# Patient Record
Sex: MALE | Race: WHITE | NOT HISPANIC OR LATINO | Employment: UNEMPLOYED | URBAN - METROPOLITAN AREA
[De-identification: names, ages, dates, MRNs, and addresses within clinical notes are randomized per-mention and may not be internally consistent; named-entity substitution may affect disease eponyms.]

---

## 2017-07-13 ENCOUNTER — OFFICE VISIT (OUTPATIENT)
Dept: FAMILY MEDICINE CLINIC | Facility: CLINIC | Age: 47
End: 2017-07-13

## 2017-07-13 VITALS
BODY MASS INDEX: 28.62 KG/M2 | DIASTOLIC BLOOD PRESSURE: 78 MMHG | WEIGHT: 211 LBS | HEART RATE: 80 BPM | SYSTOLIC BLOOD PRESSURE: 126 MMHG | OXYGEN SATURATION: 97 %

## 2017-07-13 DIAGNOSIS — J30.89 SEASONAL ALLERGIC RHINITIS DUE TO OTHER ALLERGIC TRIGGER: ICD-10-CM

## 2017-07-13 DIAGNOSIS — K21.9 GASTROESOPHAGEAL REFLUX DISEASE WITHOUT ESOPHAGITIS: Primary | ICD-10-CM

## 2017-07-13 PROCEDURE — 99203 OFFICE O/P NEW LOW 30 MIN: CPT | Performed by: NURSE PRACTITIONER

## 2017-07-13 RX ORDER — LORATADINE 10 MG/1
10 TABLET ORAL
COMMUNITY
End: 2019-02-04

## 2017-07-13 RX ORDER — ALBUTEROL SULFATE 90 UG/1
2 AEROSOL, METERED RESPIRATORY (INHALATION) EVERY 4 HOURS PRN
Qty: 1 INHALER | Refills: 1 | Status: SHIPPED | OUTPATIENT
Start: 2017-07-13 | End: 2019-02-04

## 2017-07-13 RX ORDER — MULTIVIT WITH MINERALS/LUTEIN
250 TABLET ORAL
COMMUNITY
End: 2019-02-04

## 2017-07-13 RX ORDER — MULTIVIT-MIN/IRON FUM/FOLIC AC 7.5 MG-4
1 TABLET ORAL
COMMUNITY
End: 2019-02-04

## 2017-07-13 RX ORDER — ALBUTEROL SULFATE 90 UG/1
2 AEROSOL, METERED RESPIRATORY (INHALATION)
COMMUNITY
Start: 2017-03-30 | End: 2017-07-13 | Stop reason: SDUPTHER

## 2017-07-13 NOTE — PROGRESS NOTES
Fred Bhardwaj is a 47 y.o. male.  Seen 07/13/2017    Assessment/Plan   Problem List Items Addressed This Visit     None      Visit Diagnoses     Gastroesophageal reflux disease without esophagitis    -  Primary    Seasonal allergic rhinitis due to other allergic trigger                 No Follow-up on file.  There are no Patient Instructions on file for this visit.    Vitals:    07/13/17 0934   BP: 126/78   Pulse: 80   SpO2: 97%   Weight: 211 lb (95.7 kg)     Body mass index is 28.62 kg/(m^2).    Subjective   New to out office was seeing a [place] physician but this is closer to his home.  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  H/O of Gerd and was seen for chest pain at the [facility] dx.d with Gerd and takes ranitidine as needed and watched his diet.  Takes an albuterol inhaler prn for the last couple of years and uses claritin prn.  Chief Complaint   Patient presents with   • Bronchitis     Pt is here to establish care states he usually gets bronchitis yearly. Hospital in the Spring dx with acid reflux. No complaints chest pain.      Social History   Substance Use Topics   • Smoking status: Never Smoker   • Smokeless tobacco: Never Used   • Alcohol use Yes       History of Present Illness     The following portions of the patient's history were reviewed and updated as appropriate:PMHroutine: Social history , Past Medical History, Allergies and Current Medications    Review of Systems   All other systems reviewed and are negative.      Objective   Physical Exam   Constitutional: He is oriented to person, place, and time. He appears well-developed and well-nourished. No distress.   HENT:   Head: Normocephalic and atraumatic.   Eyes: EOM are normal.   Cardiovascular: Normal rate and regular rhythm.    Pulmonary/Chest: Effort normal and breath sounds normal.   Musculoskeletal: Normal range of motion.   Neurological: He is alert and  oriented to person, place, and time.   Skin: Skin is warm and dry.   Nursing note and vitals reviewed.    Reviewed Data:  No visits with results within 1 Month(s) from this visit.  Latest known visit with results is:    Admission on 08/22/2016, Discharged on 08/22/2016   Component Date Value Ref Range Status   • Glucose 08/22/2016 137* 65 - 99 mg/dL Final   • BUN 08/22/2016 16  6 - 20 mg/dL Final   • Creatinine 08/22/2016 0.97  0.76 - 1.27 mg/dL Final   • Sodium 08/22/2016 138  136 - 145 mmol/L Final   • Potassium 08/22/2016 3.7  3.5 - 5.2 mmol/L Final   • Chloride 08/22/2016 99  98 - 107 mmol/L Final   • CO2 08/22/2016 24.5  22.0 - 29.0 mmol/L Final   • Calcium 08/22/2016 9.8  8.6 - 10.5 mg/dL Final   • Total Protein 08/22/2016 7.6  6.0 - 8.5 g/dL Final   • Albumin 08/22/2016 5.10  3.50 - 5.20 g/dL Final   • ALT (SGPT) 08/22/2016 31  1 - 41 U/L Final   • AST (SGOT) 08/22/2016 18  1 - 40 U/L Final   • Alkaline Phosphatase 08/22/2016 67  39 - 117 U/L Final   • Total Bilirubin 08/22/2016 0.2  0.1 - 1.2 mg/dL Final   • eGFR Non African Amer 08/22/2016 83  >60 mL/min/1.73 Final   • Globulin 08/22/2016 2.5  gm/dL Final   • A/G Ratio 08/22/2016 2.0  g/dL Final   • BUN/Creatinine Ratio 08/22/2016 16.5  7.0 - 25.0 Final   • Anion Gap 08/22/2016 14.5  mmol/L Final   • Troponin T 08/22/2016 <0.010  0.000 - 0.030 ng/mL Final   • Extra Tube 08/22/2016 hold for add-on   Final    Auto resulted   • Extra Tube 08/22/2016 Hold for add-ons.   Final    Auto resulted.   • Extra Tube 08/22/2016 hold for add-on   Final    Auto resulted   • Extra Tube 08/22/2016 Hold for add-ons.   Final    Auto resulted.   • WBC 08/22/2016 13.26* 4.50 - 10.70 10*3/mm3 Final   • RBC 08/22/2016 5.47  4.60 - 6.00 10*6/mm3 Final   • Hemoglobin 08/22/2016 15.9  13.7 - 17.6 g/dL Final   • Hematocrit 08/22/2016 46.5  40.4 - 52.2 % Final   • MCV 08/22/2016 85.0  79.8 - 96.2 fL Final   • MCH 08/22/2016 29.1  27.0 - 32.7 pg Final   • MCHC 08/22/2016 34.2  32.6 -  36.4 g/dL Final   • RDW 08/22/2016 13.7  11.5 - 14.5 % Final   • RDW-SD 08/22/2016 42.1  37.0 - 54.0 fl Final   • MPV 08/22/2016 10.0  6.0 - 12.0 fL Final   • Platelets 08/22/2016 243  140 - 500 10*3/mm3 Final   • Neutrophil % 08/22/2016 80.0* 42.7 - 76.0 % Final   • Lymphocyte % 08/22/2016 14.0* 19.6 - 45.3 % Final   • Monocyte % 08/22/2016 4.0* 5.0 - 12.0 % Final   • Eosinophil % 08/22/2016 0.2* 0.3 - 6.2 % Final   • Basophil % 08/22/2016 0.2  0.0 - 1.5 % Final   • Immature Grans % 08/22/2016 1.6* 0.0 - 0.5 % Final   • Neutrophils, Absolute 08/22/2016 10.62* 1.90 - 8.10 10*3/mm3 Final   • Lymphocytes, Absolute 08/22/2016 1.85  0.90 - 4.80 10*3/mm3 Final   • Monocytes, Absolute 08/22/2016 0.53  0.20 - 1.20 10*3/mm3 Final   • Eosinophils, Absolute 08/22/2016 0.02  0.00 - 0.70 10*3/mm3 Final   • Basophils, Absolute 08/22/2016 0.03  0.00 - 0.20 10*3/mm3 Final   • Immature Grans, Absolute 08/22/2016 0.21* 0.00 - 0.03 10*3/mm3 Final     Discussed medication usage and need to return in a 7-8 months for his yearly physical exam.

## 2017-09-06 ENCOUNTER — OFFICE VISIT (OUTPATIENT)
Dept: FAMILY MEDICINE CLINIC | Facility: CLINIC | Age: 47
End: 2017-09-06

## 2017-09-06 VITALS
HEART RATE: 97 BPM | WEIGHT: 212 LBS | OXYGEN SATURATION: 96 % | HEIGHT: 72 IN | DIASTOLIC BLOOD PRESSURE: 78 MMHG | BODY MASS INDEX: 28.71 KG/M2 | SYSTOLIC BLOOD PRESSURE: 138 MMHG

## 2017-09-06 DIAGNOSIS — Z23 NEED FOR VACCINATION: ICD-10-CM

## 2017-09-06 DIAGNOSIS — J01.00 ACUTE NON-RECURRENT MAXILLARY SINUSITIS: ICD-10-CM

## 2017-09-06 DIAGNOSIS — S61.219A LACERATION OF FINGER OF RIGHT HAND, INITIAL ENCOUNTER: Primary | ICD-10-CM

## 2017-09-06 DIAGNOSIS — H93.13 TINNITUS, BILATERAL: ICD-10-CM

## 2017-09-06 PROCEDURE — 99214 OFFICE O/P EST MOD 30 MIN: CPT | Performed by: NURSE PRACTITIONER

## 2017-09-06 PROCEDURE — 90471 IMMUNIZATION ADMIN: CPT | Performed by: NURSE PRACTITIONER

## 2017-09-06 PROCEDURE — 90472 IMMUNIZATION ADMIN EACH ADD: CPT | Performed by: NURSE PRACTITIONER

## 2017-09-06 PROCEDURE — 90686 IIV4 VACC NO PRSV 0.5 ML IM: CPT | Performed by: NURSE PRACTITIONER

## 2017-09-06 PROCEDURE — 90715 TDAP VACCINE 7 YRS/> IM: CPT | Performed by: NURSE PRACTITIONER

## 2017-09-06 NOTE — PROGRESS NOTES
"Fred Bhardwaj is a 47 y.o. male.Patient states that 3 days ago he cut his ring finger on the right hand on aluminum broom.    He also states that two weeks ago he was seen at an immediate care for sinus pressure and dizziness. He was given Amox, which he finishes today and Zofran. He continues to have ringing in his ears and dizziness and nasal drainage.   He took some antihistamine with a decongestant in it for a couple of days and the tinnitus cleared up.    Seen 09/06/2017    Assessment/Plan   Problem List Items Addressed This Visit     None      Visit Diagnoses     Laceration of finger of right hand, initial encounter    -  Primary    Need for vaccination        Relevant Orders    Flu Vaccine Quad PF 3YR+ (FLUARIX/FLUZONE 1636-9000) (Completed)    Acute non-recurrent maxillary sinusitis        Tinnitus, bilateral                 No Follow-up on file.  There are no Patient Instructions on file for this visit.    Subjective     Chief Complaint   Patient presents with   • Dizziness   • Laceration     Social History   Substance Use Topics   • Smoking status: Never Smoker   • Smokeless tobacco: Never Used   • Alcohol use Yes       History of Present Illness     The following portions of the patient's history were reviewed and updated as appropriate:PMHroutine: Social history , Allergies, Current Medications, Active Problem List and Health Maintenance    Review of Systems   Constitutional: Negative for activity change and appetite change.   HENT: Positive for sinus pressure, sore throat and tinnitus. Negative for ear pain.    Gastrointestinal: Negative for nausea.   Skin: Positive for wound.   Neurological: Positive for dizziness. Negative for headaches.       Objective   Vitals:    09/06/17 1136   BP: 138/78   Pulse: 97   SpO2: 96%   Weight: 212 lb (96.2 kg)   Height: 72\" (182.9 cm)     Body mass index is 28.75 kg/(m^2).  Physical Exam   Constitutional: He appears well-developed and well-nourished. No distress. "   HENT:   Head: Normocephalic and atraumatic.   Right Ear: External ear normal.   Mouth/Throat: Oropharynx is clear and moist.   Lt tm with fluid   Eyes: EOM are normal. Pupils are equal, round, and reactive to light.   Neck: Neck supple.   Cardiovascular: Normal rate and regular rhythm.    Pulmonary/Chest: Effort normal and breath sounds normal.   Neurological: He is alert.   Flap laceration to right ring finger ventral aspect. Healing well no drainage of erythema.   Skin: Skin is warm.   Nursing note and vitals reviewed.    Reviewed Data:  No visits with results within 1 Month(s) from this visit.  Latest known visit with results is:    Admission on 08/22/2016, Discharged on 08/22/2016   Component Date Value Ref Range Status   • Glucose 08/22/2016 137* 65 - 99 mg/dL Final   • BUN 08/22/2016 16  6 - 20 mg/dL Final   • Creatinine 08/22/2016 0.97  0.76 - 1.27 mg/dL Final   • Sodium 08/22/2016 138  136 - 145 mmol/L Final   • Potassium 08/22/2016 3.7  3.5 - 5.2 mmol/L Final   • Chloride 08/22/2016 99  98 - 107 mmol/L Final   • CO2 08/22/2016 24.5  22.0 - 29.0 mmol/L Final   • Calcium 08/22/2016 9.8  8.6 - 10.5 mg/dL Final   • Total Protein 08/22/2016 7.6  6.0 - 8.5 g/dL Final   • Albumin 08/22/2016 5.10  3.50 - 5.20 g/dL Final   • ALT (SGPT) 08/22/2016 31  1 - 41 U/L Final   • AST (SGOT) 08/22/2016 18  1 - 40 U/L Final   • Alkaline Phosphatase 08/22/2016 67  39 - 117 U/L Final   • Total Bilirubin 08/22/2016 0.2  0.1 - 1.2 mg/dL Final   • eGFR Non African Amer 08/22/2016 83  >60 mL/min/1.73 Final   • Globulin 08/22/2016 2.5  gm/dL Final   • A/G Ratio 08/22/2016 2.0  g/dL Final   • BUN/Creatinine Ratio 08/22/2016 16.5  7.0 - 25.0 Final   • Anion Gap 08/22/2016 14.5  mmol/L Final   • Troponin T 08/22/2016 <0.010  0.000 - 0.030 ng/mL Final   • Extra Tube 08/22/2016 hold for add-on   Final    Auto resulted   • Extra Tube 08/22/2016 Hold for add-ons.   Final    Auto resulted.   • Extra Tube 08/22/2016 hold for add-on   Final     Auto resulted   • Extra Tube 08/22/2016 Hold for add-ons.   Final    Auto resulted.   • WBC 08/22/2016 13.26* 4.50 - 10.70 10*3/mm3 Final   • RBC 08/22/2016 5.47  4.60 - 6.00 10*6/mm3 Final   • Hemoglobin 08/22/2016 15.9  13.7 - 17.6 g/dL Final   • Hematocrit 08/22/2016 46.5  40.4 - 52.2 % Final   • MCV 08/22/2016 85.0  79.8 - 96.2 fL Final   • MCH 08/22/2016 29.1  27.0 - 32.7 pg Final   • MCHC 08/22/2016 34.2  32.6 - 36.4 g/dL Final   • RDW 08/22/2016 13.7  11.5 - 14.5 % Final   • RDW-SD 08/22/2016 42.1  37.0 - 54.0 fl Final   • MPV 08/22/2016 10.0  6.0 - 12.0 fL Final   • Platelets 08/22/2016 243  140 - 500 10*3/mm3 Final   • Neutrophil % 08/22/2016 80.0* 42.7 - 76.0 % Final   • Lymphocyte % 08/22/2016 14.0* 19.6 - 45.3 % Final   • Monocyte % 08/22/2016 4.0* 5.0 - 12.0 % Final   • Eosinophil % 08/22/2016 0.2* 0.3 - 6.2 % Final   • Basophil % 08/22/2016 0.2  0.0 - 1.5 % Final   • Immature Grans % 08/22/2016 1.6* 0.0 - 0.5 % Final   • Neutrophils, Absolute 08/22/2016 10.62* 1.90 - 8.10 10*3/mm3 Final   • Lymphocytes, Absolute 08/22/2016 1.85  0.90 - 4.80 10*3/mm3 Final   • Monocytes, Absolute 08/22/2016 0.53  0.20 - 1.20 10*3/mm3 Final   • Eosinophils, Absolute 08/22/2016 0.02  0.00 - 0.70 10*3/mm3 Final   • Basophils, Absolute 08/22/2016 0.03  0.00 - 0.20 10*3/mm3 Final   • Immature Grans, Absolute 08/22/2016 0.21* 0.00 - 0.03 10*3/mm3 Final     Discussed wearing a splint at night on his finger to assist in the healing process and pt is good with the plan.

## 2017-09-20 ENCOUNTER — OFFICE VISIT (OUTPATIENT)
Dept: FAMILY MEDICINE CLINIC | Facility: CLINIC | Age: 47
End: 2017-09-20

## 2017-09-20 VITALS
OXYGEN SATURATION: 98 % | DIASTOLIC BLOOD PRESSURE: 80 MMHG | HEART RATE: 79 BPM | BODY MASS INDEX: 28.17 KG/M2 | SYSTOLIC BLOOD PRESSURE: 118 MMHG | HEIGHT: 72 IN | WEIGHT: 208 LBS

## 2017-09-20 DIAGNOSIS — H66.004 RECURRENT ACUTE SUPPURATIVE OTITIS MEDIA OF RIGHT EAR WITHOUT SPONTANEOUS RUPTURE OF TYMPANIC MEMBRANE: ICD-10-CM

## 2017-09-20 DIAGNOSIS — J30.1 SEASONAL ALLERGIC RHINITIS DUE TO POLLEN: Primary | ICD-10-CM

## 2017-09-20 PROCEDURE — 99213 OFFICE O/P EST LOW 20 MIN: CPT | Performed by: NURSE PRACTITIONER

## 2017-09-20 RX ORDER — AMOXICILLIN 875 MG/1
875 TABLET, COATED ORAL 2 TIMES DAILY
Qty: 20 TABLET | Refills: 0 | Status: SHIPPED | OUTPATIENT
Start: 2017-09-20 | End: 2017-10-03

## 2017-09-20 RX ORDER — FLUTICASONE PROPIONATE 50 MCG
2 SPRAY, SUSPENSION (ML) NASAL DAILY
Qty: 18.2 BOTTLE | Refills: 2 | Status: SHIPPED | OUTPATIENT
Start: 2017-09-20 | End: 2017-10-03

## 2017-09-20 NOTE — PROGRESS NOTES
"Fred Bhardwaj is a 47 y.o. male. Sinus Pain: Patient complains of bilateral ear pressure/pain. Symptoms include congestion, nasal congestion, non productive cough, post nasal drip and sinus pressure with no fever, chills, night sweats or weight loss. Onset of symptoms was 5 days ago, gradually worsening since that time. He is drinking plenty of fluids.  Past history is significant for no history of pneumonia or bronchitis. Patient is non-smoker. Pt complains of left ear pain and headache.   Pt has a hx of ear pain.     Seen 09/20/2017    Assessment/Plan   Problem List Items Addressed This Visit     None      Visit Diagnoses     Seasonal allergic rhinitis due to pollen    -  Primary    Relevant Medications    fluticasone (FLONASE) 50 MCG/ACT nasal spray    amoxicillin (AMOXIL) 875 MG tablet    Recurrent acute suppurative otitis media of right ear without spontaneous rupture of tympanic membrane                 No Follow-up on file.  Patient Instructions   Pt has otitis media and can be excused from work tonight       Subjective     Chief Complaint   Patient presents with   • Dizziness   • Ear pain     Social History   Substance Use Topics   • Smoking status: Never Smoker   • Smokeless tobacco: Never Used   • Alcohol use Yes       History of Present Illness     The following portions of the patient's history were reviewed and updated as appropriate:PMHroutine: Social history , Allergies, Current Medications, Active Problem List and Health Maintenance    Review of Systems   HENT: Positive for sinus pain and tinnitus.    Neurological: Positive for dizziness.       Objective   Vitals:    09/20/17 1053   BP: 118/80   Pulse: 79   SpO2: 98%   Weight: 208 lb (94.3 kg)   Height: 72\" (182.9 cm)     Body mass index is 28.21 kg/(m^2).  Physical Exam   Constitutional: He is oriented to person, place, and time. He appears well-developed and well-nourished. No distress.   HENT:   Head: Normocephalic and atraumatic.   Right Ear: " Hearing and external ear normal.   Left Ear: Hearing and external ear normal. Tympanic membrane is injected. A middle ear effusion is present.   Nose: Nose normal.   Mouth/Throat: Oropharynx is clear and moist. No oropharyngeal exudate.   Eyes: Conjunctivae and EOM are normal. Pupils are equal, round, and reactive to light.   Neck: Normal range of motion.   Cardiovascular: Normal rate and regular rhythm.    Pulmonary/Chest: Effort normal and breath sounds normal. No stridor. No respiratory distress. He has no wheezes.   Lymphadenopathy:     He has no cervical adenopathy.   Neurological: He is alert and oriented to person, place, and time.   Skin: Skin is warm and dry.   Psychiatric: He has a normal mood and affect. His behavior is normal.   Nursing note and vitals reviewed.    Reviewed Data:  No visits with results within 1 Month(s) from this visit.  Latest known visit with results is:    Admission on 08/22/2016, Discharged on 08/22/2016   Component Date Value Ref Range Status   • Glucose 08/22/2016 137* 65 - 99 mg/dL Final   • BUN 08/22/2016 16  6 - 20 mg/dL Final   • Creatinine 08/22/2016 0.97  0.76 - 1.27 mg/dL Final   • Sodium 08/22/2016 138  136 - 145 mmol/L Final   • Potassium 08/22/2016 3.7  3.5 - 5.2 mmol/L Final   • Chloride 08/22/2016 99  98 - 107 mmol/L Final   • CO2 08/22/2016 24.5  22.0 - 29.0 mmol/L Final   • Calcium 08/22/2016 9.8  8.6 - 10.5 mg/dL Final   • Total Protein 08/22/2016 7.6  6.0 - 8.5 g/dL Final   • Albumin 08/22/2016 5.10  3.50 - 5.20 g/dL Final   • ALT (SGPT) 08/22/2016 31  1 - 41 U/L Final   • AST (SGOT) 08/22/2016 18  1 - 40 U/L Final   • Alkaline Phosphatase 08/22/2016 67  39 - 117 U/L Final   • Total Bilirubin 08/22/2016 0.2  0.1 - 1.2 mg/dL Final   • eGFR Non African Amer 08/22/2016 83  >60 mL/min/1.73 Final   • Globulin 08/22/2016 2.5  gm/dL Final   • A/G Ratio 08/22/2016 2.0  g/dL Final   • BUN/Creatinine Ratio 08/22/2016 16.5  7.0 - 25.0 Final   • Anion Gap 08/22/2016 14.5   mmol/L Final   • Troponin T 08/22/2016 <0.010  0.000 - 0.030 ng/mL Final   • Extra Tube 08/22/2016 hold for add-on   Final    Auto resulted   • Extra Tube 08/22/2016 Hold for add-ons.   Final    Auto resulted.   • Extra Tube 08/22/2016 hold for add-on   Final    Auto resulted   • Extra Tube 08/22/2016 Hold for add-ons.   Final    Auto resulted.   • WBC 08/22/2016 13.26* 4.50 - 10.70 10*3/mm3 Final   • RBC 08/22/2016 5.47  4.60 - 6.00 10*6/mm3 Final   • Hemoglobin 08/22/2016 15.9  13.7 - 17.6 g/dL Final   • Hematocrit 08/22/2016 46.5  40.4 - 52.2 % Final   • MCV 08/22/2016 85.0  79.8 - 96.2 fL Final   • MCH 08/22/2016 29.1  27.0 - 32.7 pg Final   • MCHC 08/22/2016 34.2  32.6 - 36.4 g/dL Final   • RDW 08/22/2016 13.7  11.5 - 14.5 % Final   • RDW-SD 08/22/2016 42.1  37.0 - 54.0 fl Final   • MPV 08/22/2016 10.0  6.0 - 12.0 fL Final   • Platelets 08/22/2016 243  140 - 500 10*3/mm3 Final   • Neutrophil % 08/22/2016 80.0* 42.7 - 76.0 % Final   • Lymphocyte % 08/22/2016 14.0* 19.6 - 45.3 % Final   • Monocyte % 08/22/2016 4.0* 5.0 - 12.0 % Final   • Eosinophil % 08/22/2016 0.2* 0.3 - 6.2 % Final   • Basophil % 08/22/2016 0.2  0.0 - 1.5 % Final   • Immature Grans % 08/22/2016 1.6* 0.0 - 0.5 % Final   • Neutrophils, Absolute 08/22/2016 10.62* 1.90 - 8.10 10*3/mm3 Final   • Lymphocytes, Absolute 08/22/2016 1.85  0.90 - 4.80 10*3/mm3 Final   • Monocytes, Absolute 08/22/2016 0.53  0.20 - 1.20 10*3/mm3 Final   • Eosinophils, Absolute 08/22/2016 0.02  0.00 - 0.70 10*3/mm3 Final   • Basophils, Absolute 08/22/2016 0.03  0.00 - 0.20 10*3/mm3 Final   • Immature Grans, Absolute 08/22/2016 0.21* 0.00 - 0.03 10*3/mm3 Final

## 2017-10-03 ENCOUNTER — OFFICE VISIT (OUTPATIENT)
Dept: FAMILY MEDICINE CLINIC | Facility: CLINIC | Age: 47
End: 2017-10-03

## 2017-10-03 VITALS
BODY MASS INDEX: 28.31 KG/M2 | OXYGEN SATURATION: 98 % | SYSTOLIC BLOOD PRESSURE: 130 MMHG | DIASTOLIC BLOOD PRESSURE: 88 MMHG | WEIGHT: 209 LBS | HEART RATE: 81 BPM | HEIGHT: 72 IN

## 2017-10-03 DIAGNOSIS — H81.03 MENIERE DISEASE, BILATERAL: Primary | ICD-10-CM

## 2017-10-03 PROCEDURE — 99213 OFFICE O/P EST LOW 20 MIN: CPT | Performed by: NURSE PRACTITIONER

## 2017-10-03 RX ORDER — TRIAMTERENE AND HYDROCHLOROTHIAZIDE 37.5; 25 MG/1; MG/1
CAPSULE ORAL
Refills: 0 | COMMUNITY
Start: 2017-09-26 | End: 2019-02-04

## 2017-10-03 RX ORDER — METHYLPREDNISOLONE 4 MG/1
TABLET ORAL
Refills: 0 | COMMUNITY
Start: 2017-09-26 | End: 2017-10-03

## 2017-10-03 NOTE — PATIENT INSTRUCTIONS
Vertigo  Vertigo is the feeling that you or your surroundings are moving when they are not. Vertigo can be dangerous if it occurs while you are doing something that could endanger you or others, such as driving.  CAUSES  This condition is caused by a disturbance in the signals that are sent by your body's sensory systems to your brain. Different causes of a disturbance can lead to vertigo, including:  · Infections, especially in the inner ear.  · A bad reaction to a drug, or misuse of alcohol and medicines.  · Withdrawal from drugs or alcohol.  · Quickly changing positions, as when lying down or rolling over in bed.  · Migraine headaches.  · Decreased blood flow to the brain.  · Decreased blood pressure.  · Increased pressure in the brain from a head or neck injury, stroke, infection, tumor, or bleeding.  · Central nervous system disorders.  SYMPTOMS  Symptoms of this condition usually occur when you move your head or your eyes in different directions. Symptoms may start suddenly, and they usually last for less than a minute. Symptoms may include:  · Loss of balance and falling.  · Feeling like you are spinning or moving.  · Feeling like your surroundings are spinning or moving.  · Nausea and vomiting.  · Blurred vision or double vision.  · Difficulty hearing.  · Slurred speech.  · Dizziness.  · Involuntary eye movement (nystagmus).  Symptoms can be mild and cause only slight annoyance, or they can be severe and interfere with daily life. Episodes of vertigo may return (recur) over time, and they are often triggered by certain movements. Symptoms may improve over time.  DIAGNOSIS  This condition may be diagnosed based on medical history and the quality of your nystagmus. Your health care provider may test your eye movements by asking you to quickly change positions to trigger the nystagmus. This may be called the Javier-Hallpike test, head thrust test, or roll test. You may be referred to a health care provider who  specializes in ear, nose, and throat (ENT) problems (otolaryngologist) or a provider who specializes in disorders of the central nervous system (neurologist).  You may have additional testing, including:  · A physical exam.  · Blood tests.  · MRI.  · A CT scan.  · An electrocardiogram (ECG). This records electrical activity in your heart.  · An electroencephalogram (EEG). This records electrical activity in your brain.  · Hearing tests.  TREATMENT  Treatment for this condition depends on the cause and the severity of the symptoms. Treatment options include:  · Medicines to treat nausea or vertigo. These are usually used for severe cases. Some medicines that are used to treat other conditions may also reduce or eliminate vertigo symptoms. These include:    Medicines that control allergies (antihistamines).    Medicines that control seizures (anticonvulsants).    Medicines that relieve depression (antidepressants).    Medicines that relieve anxiety (sedatives).  · Head movements to adjust your inner ear back to normal. If your vertigo is caused by an ear problem, your health care provider may recommend certain movements to correct the problem.  · Surgery. This is rare.  HOME CARE INSTRUCTIONS  Safety   · Move slowly. Avoid sudden body or head movements.  · Avoid driving.  · Avoid operating heavy machinery.  · Avoid doing any tasks that would cause danger to you or others if you would have a vertigo episode during the task.  · If you have trouble walking or keeping your balance, try using a cane for stability. If you feel dizzy or unstable, sit down right away.  · Return to your normal activities as told by your health care provider. Ask your health care provider what activities are safe for you.  General Instructions   · Take over-the-counter and prescription medicines only as told by your health care provider.  · Avoid certain positions or movements as told by your health care provider.  · Drink enough fluid to keep  "your urine clear or pale yellow.  · Keep all follow-up visits as told by your health care provider. This is important.  SEEK MEDICAL CARE IF:  · Your medicines do not relieve your vertigo or they make it worse.  · You have a fever.  · Your condition gets worse or you develop new symptoms.  · Your family or friends notice any behavioral changes.  · Your nausea or vomiting gets worse.  · You have numbness or a \"pins and needles\" sensation in part of your body.  SEEK IMMEDIATE MEDICAL CARE IF:  · You have difficulty moving or speaking.  · You are always dizzy.  · You faint.  · You develop severe headaches.  · You have weakness in your hands, arms, or legs.  · You have changes in your hearing or vision.  · You develop a stiff neck.  · You develop sensitivity to light.     This information is not intended to replace advice given to you by your health care provider. Make sure you discuss any questions you have with your health care provider.     Document Released: 09/27/2006 Document Revised: 04/10/2017 Document Reviewed: 04/11/2016  [employer] Interactive Patient Education ©2017 [employer]    "

## 2017-10-03 NOTE — PROGRESS NOTES
"Fred Bhardwaj is a 47 y.o. male.Patient states a PA at [facility] diagnosed him with Meniere's disease. He would like a second opinion to rule out a brain tumor. Was placed on promethazine and feels better and it helps him sleep.  He is scheduled to see the ENT of the 9th of this month.  The dizziness comes and goes, he had it in August and it lasted a day.  Saw the ENT on the 26th who thought it was Meniere's disease.  Currently applying for FMLA. Is unable to drive.    Seen 10/03/2017    Assessment/Plan   Problem List Items Addressed This Visit     None             No Follow-up on file.  There are no Patient Instructions on file for this visit.    Subjective     Chief Complaint   Patient presents with   • Headache     Social History   Substance Use Topics   • Smoking status: Never Smoker   • Smokeless tobacco: Never Used   • Alcohol use Yes       History of Present Illness     The following portions of the patient's history were reviewed and updated as appropriate:PMHroutine: Social history , Allergies, Current Medications and Active Problem List    Review of Systems   Constitutional: Positive for fatigue.   HENT: Positive for ear pain and sinus pain.    Musculoskeletal: Negative for back pain, gait problem, myalgias, neck pain and neck stiffness.   Neurological: Positive for dizziness and headaches. Negative for syncope and numbness.       Objective   Vitals:    10/03/17 0934   BP: 130/88   Pulse: 81   SpO2: 98%   Weight: 209 lb (94.8 kg)   Height: 72\" (182.9 cm)     Body mass index is 28.35 kg/(m^2).  Physical Exam   Constitutional: He is oriented to person, place, and time. He appears well-developed and well-nourished. He appears distressed.   HENT:   Head: Normocephalic and atraumatic.   Right Ear: External ear normal.   Left Ear: External ear normal.   Mouth/Throat: Oropharynx is clear and moist.   Eyes: EOM are normal. Pupils are equal, round, and reactive to light.   Neck: Neck supple.   Cardiovascular: " Normal rate and regular rhythm.    Pulmonary/Chest: Effort normal and breath sounds normal.   Musculoskeletal: Normal range of motion. He exhibits no edema or deformity.   Neurological: He is alert and oriented to person, place, and time. He displays normal reflexes. No cranial nerve deficit. He exhibits normal muscle tone. He displays a negative Romberg sign. Coordination and gait normal. GCS eye subscore is 4. GCS verbal subscore is 5. GCS motor subscore is 6.   Nursing note and vitals reviewed.    Reviewed Data:  No visits with results within 1 Month(s) from this visit.  Latest known visit with results is:    Admission on 08/22/2016, Discharged on 08/22/2016   Component Date Value Ref Range Status   • Glucose 08/22/2016 137* 65 - 99 mg/dL Final   • BUN 08/22/2016 16  6 - 20 mg/dL Final   • Creatinine 08/22/2016 0.97  0.76 - 1.27 mg/dL Final   • Sodium 08/22/2016 138  136 - 145 mmol/L Final   • Potassium 08/22/2016 3.7  3.5 - 5.2 mmol/L Final   • Chloride 08/22/2016 99  98 - 107 mmol/L Final   • CO2 08/22/2016 24.5  22.0 - 29.0 mmol/L Final   • Calcium 08/22/2016 9.8  8.6 - 10.5 mg/dL Final   • Total Protein 08/22/2016 7.6  6.0 - 8.5 g/dL Final   • Albumin 08/22/2016 5.10  3.50 - 5.20 g/dL Final   • ALT (SGPT) 08/22/2016 31  1 - 41 U/L Final   • AST (SGOT) 08/22/2016 18  1 - 40 U/L Final   • Alkaline Phosphatase 08/22/2016 67  39 - 117 U/L Final   • Total Bilirubin 08/22/2016 0.2  0.1 - 1.2 mg/dL Final   • eGFR Non African Amer 08/22/2016 83  >60 mL/min/1.73 Final   • Globulin 08/22/2016 2.5  gm/dL Final   • A/G Ratio 08/22/2016 2.0  g/dL Final   • BUN/Creatinine Ratio 08/22/2016 16.5  7.0 - 25.0 Final   • Anion Gap 08/22/2016 14.5  mmol/L Final   • Troponin T 08/22/2016 <0.010  0.000 - 0.030 ng/mL Final   • Extra Tube 08/22/2016 hold for add-on   Final    Auto resulted   • Extra Tube 08/22/2016 Hold for add-ons.   Final    Auto resulted.   • Extra Tube 08/22/2016 hold for add-on   Final    Auto resulted   • Extra  Tube 08/22/2016 Hold for add-ons.   Final    Auto resulted.   • WBC 08/22/2016 13.26* 4.50 - 10.70 10*3/mm3 Final   • RBC 08/22/2016 5.47  4.60 - 6.00 10*6/mm3 Final   • Hemoglobin 08/22/2016 15.9  13.7 - 17.6 g/dL Final   • Hematocrit 08/22/2016 46.5  40.4 - 52.2 % Final   • MCV 08/22/2016 85.0  79.8 - 96.2 fL Final   • MCH 08/22/2016 29.1  27.0 - 32.7 pg Final   • MCHC 08/22/2016 34.2  32.6 - 36.4 g/dL Final   • RDW 08/22/2016 13.7  11.5 - 14.5 % Final   • RDW-SD 08/22/2016 42.1  37.0 - 54.0 fl Final   • MPV 08/22/2016 10.0  6.0 - 12.0 fL Final   • Platelets 08/22/2016 243  140 - 500 10*3/mm3 Final   • Neutrophil % 08/22/2016 80.0* 42.7 - 76.0 % Final   • Lymphocyte % 08/22/2016 14.0* 19.6 - 45.3 % Final   • Monocyte % 08/22/2016 4.0* 5.0 - 12.0 % Final   • Eosinophil % 08/22/2016 0.2* 0.3 - 6.2 % Final   • Basophil % 08/22/2016 0.2  0.0 - 1.5 % Final   • Immature Grans % 08/22/2016 1.6* 0.0 - 0.5 % Final   • Neutrophils, Absolute 08/22/2016 10.62* 1.90 - 8.10 10*3/mm3 Final   • Lymphocytes, Absolute 08/22/2016 1.85  0.90 - 4.80 10*3/mm3 Final   • Monocytes, Absolute 08/22/2016 0.53  0.20 - 1.20 10*3/mm3 Final   • Eosinophils, Absolute 08/22/2016 0.02  0.00 - 0.70 10*3/mm3 Final   • Basophils, Absolute 08/22/2016 0.03  0.00 - 0.20 10*3/mm3 Final   • Immature Grans, Absolute 08/22/2016 0.21* 0.00 - 0.03 10*3/mm3 Final     Normal neurologic evaluation, no imaging needed at this time, is worsens will call back.

## 2019-02-04 ENCOUNTER — OFFICE VISIT (OUTPATIENT)
Dept: FAMILY MEDICINE CLINIC | Facility: CLINIC | Age: 49
End: 2019-02-04

## 2019-02-04 VITALS
WEIGHT: 236 LBS | SYSTOLIC BLOOD PRESSURE: 116 MMHG | HEIGHT: 72 IN | OXYGEN SATURATION: 98 % | TEMPERATURE: 98.6 F | BODY MASS INDEX: 31.97 KG/M2 | DIASTOLIC BLOOD PRESSURE: 70 MMHG | HEART RATE: 76 BPM | RESPIRATION RATE: 16 BRPM

## 2019-02-04 DIAGNOSIS — G43.001 MIGRAINE WITHOUT AURA AND WITH STATUS MIGRAINOSUS, NOT INTRACTABLE: ICD-10-CM

## 2019-02-04 DIAGNOSIS — H81.13 BENIGN PAROXYSMAL POSITIONAL VERTIGO DUE TO BILATERAL VESTIBULAR DISORDER: ICD-10-CM

## 2019-02-04 DIAGNOSIS — Z00.00 PHYSICAL EXAM: Primary | ICD-10-CM

## 2019-02-04 DIAGNOSIS — K21.00 GASTROESOPHAGEAL REFLUX DISEASE WITH ESOPHAGITIS: ICD-10-CM

## 2019-02-04 PROCEDURE — 99212 OFFICE O/P EST SF 10 MIN: CPT | Performed by: NURSE PRACTITIONER

## 2019-02-04 PROCEDURE — 99396 PREV VISIT EST AGE 40-64: CPT | Performed by: NURSE PRACTITIONER

## 2019-02-04 RX ORDER — METOPROLOL SUCCINATE 200 MG/1
200 TABLET, EXTENDED RELEASE ORAL
COMMUNITY
Start: 2018-11-29 | End: 2020-07-02 | Stop reason: ALTCHOICE

## 2019-02-04 RX ORDER — DULOXETIN HYDROCHLORIDE 20 MG/1
20 CAPSULE, DELAYED RELEASE ORAL DAILY
COMMUNITY
End: 2022-08-18

## 2019-02-04 RX ORDER — MAGNESIUM OXIDE 400 MG/1
400 TABLET ORAL DAILY
COMMUNITY

## 2019-02-04 NOTE — PROGRESS NOTES
Subjective   Fred Bhardwaj is a 48 y.o. male.   H/O migraines and BVVP and is being followed by the [facility] and migraines which he has daily about 4 days out of the week, is unable to hold a job down and walks with a cane  H/O gerd but stopped taking his medicine  PMHX and PSHX reviewed with pt and in chart, has been unemployed since Oct 2017  Is stressed out  Reports that he had a colonoscopy 2 years ago and it was fine  History of Present Illness     The following portions of the patient's history were reviewed and updated as appropriate: allergies, current medications, past family history, past medical history, past social history, past surgical history and problem list.    Review of Systems   Constitutional: Negative for activity change and appetite change.   HENT: Negative for congestion.    Respiratory: Negative for cough and shortness of breath.    Neurological: Positive for dizziness and headaches. Negative for light-headedness.       Objective   Physical Exam   Constitutional: He is oriented to person, place, and time. He appears well-developed and well-nourished.   HENT:   Head: Normocephalic and atraumatic.   Right Ear: External ear normal.   Left Ear: External ear normal.   Eyes: EOM are normal. Pupils are equal, round, and reactive to light.   Cardiovascular: Normal rate and regular rhythm.   Pulmonary/Chest: Effort normal and breath sounds normal.   Musculoskeletal: Normal range of motion.   Neurological: He is alert and oriented to person, place, and time.   Skin: Skin is warm.   Nursing note and vitals reviewed.      Assessment/Plan     Physical exam  BPPV  GERD  Migraine without aura  Will follow with labs

## 2019-02-05 LAB
ALBUMIN SERPL-MCNC: 4.8 G/DL (ref 3.5–5.5)
ALBUMIN/GLOB SERPL: 1.8 {RATIO} (ref 1.2–2.2)
ALP SERPL-CCNC: 71 IU/L (ref 39–117)
ALT SERPL-CCNC: 83 IU/L (ref 0–44)
AST SERPL-CCNC: 47 IU/L (ref 0–40)
BILIRUB SERPL-MCNC: <0.2 MG/DL (ref 0–1.2)
BUN SERPL-MCNC: 13 MG/DL (ref 6–24)
BUN/CREAT SERPL: 15 (ref 9–20)
CALCIUM SERPL-MCNC: 9.8 MG/DL (ref 8.7–10.2)
CHLORIDE SERPL-SCNC: 104 MMOL/L (ref 96–106)
CHOLEST SERPL-MCNC: 182 MG/DL (ref 100–199)
CHOLEST/HDLC SERPL: 4.4 RATIO (ref 0–5)
CO2 SERPL-SCNC: 24 MMOL/L (ref 20–29)
CREAT SERPL-MCNC: 0.89 MG/DL (ref 0.76–1.27)
GLOBULIN SER CALC-MCNC: 2.6 G/DL (ref 1.5–4.5)
GLUCOSE SERPL-MCNC: 96 MG/DL (ref 65–99)
HDLC SERPL-MCNC: 41 MG/DL
LDLC SERPL CALC-MCNC: 88 MG/DL (ref 0–99)
POTASSIUM SERPL-SCNC: 4.5 MMOL/L (ref 3.5–5.2)
PROT SERPL-MCNC: 7.4 G/DL (ref 6–8.5)
SODIUM SERPL-SCNC: 143 MMOL/L (ref 134–144)
TRIGL SERPL-MCNC: 264 MG/DL (ref 0–149)
VLDLC SERPL CALC-MCNC: 53 MG/DL (ref 5–40)

## 2019-02-08 ENCOUNTER — TELEPHONE (OUTPATIENT)
Dept: FAMILY MEDICINE CLINIC | Facility: CLINIC | Age: 49
End: 2019-02-08

## 2019-02-08 DIAGNOSIS — R74.8 ELEVATED LIVER ENZYMES: Primary | ICD-10-CM

## 2019-02-08 DIAGNOSIS — E78.2 ELEVATED CHOLESTEROL WITH ELEVATED TRIGLYCERIDES: ICD-10-CM

## 2019-02-08 NOTE — TELEPHONE ENCOUNTER
Patient is asking for a referral to sleep medicine in [facility], for breathing issues and grogginess.

## 2019-02-08 NOTE — TELEPHONE ENCOUNTER
[facility], 2/8/19    Your triglycerides are elevated and your ALT (liver) is elevated. Decrease your carbohydrate, added sugar, and alcohol intake to reduce these numbers. Please schedule a fasting lab appointment in 3 weeks so that we can recheck these numbers.

## 2019-02-11 DIAGNOSIS — G47.30 SLEEP APNEA, UNSPECIFIED TYPE: Primary | ICD-10-CM

## 2019-02-13 ENCOUNTER — RESULTS ENCOUNTER (OUTPATIENT)
Dept: FAMILY MEDICINE CLINIC | Facility: CLINIC | Age: 49
End: 2019-02-13

## 2019-02-13 DIAGNOSIS — R74.8 ELEVATED LIVER ENZYMES: ICD-10-CM

## 2019-02-13 DIAGNOSIS — E78.2 ELEVATED CHOLESTEROL WITH ELEVATED TRIGLYCERIDES: ICD-10-CM

## 2019-03-01 LAB
ALBUMIN SERPL-MCNC: 4.4 G/DL (ref 3.5–5.2)
ALBUMIN/GLOB SERPL: 1.7 G/DL
ALP SERPL-CCNC: 59 U/L (ref 39–117)
ALT SERPL-CCNC: 63 U/L (ref 1–41)
AST SERPL-CCNC: 38 U/L (ref 1–40)
BILIRUB SERPL-MCNC: 0.3 MG/DL (ref 0.1–1.2)
BUN SERPL-MCNC: 18 MG/DL (ref 6–20)
BUN/CREAT SERPL: 18.9 (ref 7–25)
CALCIUM SERPL-MCNC: 9.4 MG/DL (ref 8.6–10.5)
CHLORIDE SERPL-SCNC: 106 MMOL/L (ref 98–107)
CHOLEST SERPL-MCNC: 146 MG/DL (ref 0–200)
CO2 SERPL-SCNC: 22.8 MMOL/L (ref 22–29)
CREAT SERPL-MCNC: 0.95 MG/DL (ref 0.76–1.27)
GLOBULIN SER CALC-MCNC: 2.6 GM/DL
GLUCOSE SERPL-MCNC: 95 MG/DL (ref 65–99)
HDLC SERPL-MCNC: 41 MG/DL (ref 40–60)
LDLC SERPL CALC-MCNC: 89 MG/DL (ref 0–100)
LDLC/HDLC SERPL: 2.17 {RATIO}
POTASSIUM SERPL-SCNC: 4.3 MMOL/L (ref 3.5–5.2)
PROT SERPL-MCNC: 7 G/DL (ref 6–8.5)
SODIUM SERPL-SCNC: 140 MMOL/L (ref 136–145)
TRIGL SERPL-MCNC: 81 MG/DL (ref 0–150)
VLDLC SERPL CALC-MCNC: 16.2 MG/DL (ref 5–40)

## 2019-03-04 ENCOUNTER — OFFICE VISIT (OUTPATIENT)
Dept: SLEEP MEDICINE | Facility: HOSPITAL | Age: 49
End: 2019-03-04

## 2019-03-04 VITALS
HEART RATE: 57 BPM | DIASTOLIC BLOOD PRESSURE: 71 MMHG | HEIGHT: 72 IN | WEIGHT: 232.6 LBS | SYSTOLIC BLOOD PRESSURE: 123 MMHG | BODY MASS INDEX: 31.51 KG/M2 | OXYGEN SATURATION: 96 %

## 2019-03-04 DIAGNOSIS — E66.9 OBESITY (BMI 35.0-39.9 WITHOUT COMORBIDITY): ICD-10-CM

## 2019-03-04 DIAGNOSIS — R06.81 WITNESSED EPISODE OF APNEA: ICD-10-CM

## 2019-03-04 DIAGNOSIS — G47.10 HYPERSOMNIA: Primary | ICD-10-CM

## 2019-03-04 PROCEDURE — G0463 HOSPITAL OUTPT CLINIC VISIT: HCPCS

## 2019-03-04 NOTE — PROGRESS NOTES
"[facility]  Telephone: [phone] / Fax: [phone] [place]  Telephone: [phone] / Fax: [phone] Irma Mcgraw    Referring Physician: Renita Fischer APRN  PCP: Renita Fischer APRN    Reason for consult:  sleep apnea    Fred Bhardwaj is a 48 y.o.male  was seen in the [facility] today for evaluation of sleep apnea.  Wife reports witnessed apneas and episodes of gasping respirations for the past few months. He has had loud snoring for many years.  He has normal breathing for 4-5 breaths, then it becomes shallow and then stops. It is worse on his back but occurring in all positions. He feels tired when he wakes up in the morning despite increasing his sleep time. His sleep schedule is 1:30pm-8am. He wakes up groggy in the morning. In the past 5 yrs he gained 20 lbs.    SH-  1.5 pints of tea, no alcohol, no drugs.    ROS-+ dizziness and +anxiety. Rest is negative.    Fred Bhardwaj  has a past medical history of Asthma, BPPV (benign paroxysmal positional vertigo), left, Depression, GERD (gastroesophageal reflux disease), and Meniere disease.    Current Medications:    Current Outpatient Medications:   •  DULoxetine (CYMBALTA) 20 MG capsule, Take 20 mg by mouth Daily., Disp: , Rfl:   •  magnesium oxide (MAG-OX) 400 MG tablet, Take 400 mg by mouth Daily., Disp: , Rfl:   •  metoprolol succinate XL (TOPROL-XL) 200 MG 24 hr tablet, Take 200 mg by mouth., Disp: , Rfl:     I have reviewed Past Medical History, Past Surgical History, Medication List, Social History and Family History as entered in Sleep Questionnaire and EPIC.    ESS  8   Vital Signs /71   Pulse 57   Ht 182.9 cm (72\")   Wt 106 kg (232 lb 9.6 oz)   SpO2 96%   BMI 31.55 kg/m²  Body mass index is 31.55 kg/m².    General Alert and oriented. No acute distress noted   Pharynx/Throat Class IV Mallampati airway, large tongue, no evidence of redundant lateral pharyngeal tissue. No oral lesions. No thrush. " Moist mucous membranes.   Head Normocephalic. Symmetrical. Atraumatic.    Nose No septal deviation. No drainage   Chest Wall Normal shape. Symmetric expansion with respiration. No tenderness.   Neck Trachea midline, no thyromegaly or adenopathy    Lungs Clear to auscultation bilaterally. No wheezes. No rhonchi. No rales. Respirations regular, even and unlabored.   Heart Regular rhythm and normal rate. Normal S1 and S2. No murmur   Abdomen Soft, non-tender and non-distended. Normal bowel sounds. No masses.   Extremities Moves all extremities well. No edema   Psychiatric Normal mood and affect.         .        .     Impression:  1. Hypersomnia    2. Witnessed episode of apnea    3. Obesity (BMI 35.0-39.9 without comorbidity)          Plan:  I discussed the pathophysiology of obstructive sleep apnea with the patient.  We discussed the adverse outcomes associated with untreated sleep-disordered breathing.  We discussed treatment modalities of obstructive sleep apnea including CPAP device as well as oral mandibular advancement device. Sleep study will be scheduled to establish definitive diagnosis of sleep disorder breathing.  Weight loss will be strongly beneficial in order to reduce the severity of sleep-disordered breathing.  Patient has narrow oropharyngeal structure.  Caution during activities that require prolonged concentration is strongly advised.  Patient will be notified of sleep study results after sleep study is completed.  If sleep apnea is only mild,  oral mandibular advancement device may be one of the treatment options.  However if sleep apnea is moderately severe, CPAP treatment will be strongly encouraged.  The patient is not opposed to treatment with CPAP device if we confirm significant obstructive sleep apnea on polysomnography.     Thank you for allowing me to participate in your patient's care.    The patient will follow up with Dr. Yoon after completion of HST.      Ana Hale  YESSICA  [facility]  Phone: [phone]      Part of this note may be an electronic transcription/translation of spoken language to printed text using the Dragon Dictation System. Some errors may exist even though the document was edited.

## 2019-04-10 ENCOUNTER — APPOINTMENT (OUTPATIENT)
Dept: SLEEP MEDICINE | Facility: HOSPITAL | Age: 49
End: 2019-04-10

## 2019-04-18 ENCOUNTER — HOSPITAL ENCOUNTER (OUTPATIENT)
Dept: SLEEP MEDICINE | Facility: HOSPITAL | Age: 49
Discharge: HOME | End: 2019-04-18
Admitting: NURSE PRACTITIONER

## 2019-04-18 DIAGNOSIS — E66.9 OBESITY (BMI 35.0-39.9 WITHOUT COMORBIDITY): ICD-10-CM

## 2019-04-18 DIAGNOSIS — R06.81 WITNESSED EPISODE OF APNEA: ICD-10-CM

## 2019-04-18 DIAGNOSIS — G47.10 HYPERSOMNIA: ICD-10-CM

## 2019-04-18 PROCEDURE — 95806 SLEEP STUDY UNATT&RESP EFFT: CPT

## 2019-04-29 ENCOUNTER — TELEPHONE (OUTPATIENT)
Dept: SLEEP MEDICINE | Facility: HOSPITAL | Age: 49
End: 2019-04-29

## 2019-04-29 NOTE — TELEPHONE ENCOUNTER
Spoke to patient and his wife about HST results and Dr. Yoon's recommendation for treatment. Setup faxed to [facility] for set up.  Pt's compliance appt set for 8/2/2019.  maxi

## 2019-05-07 ENCOUNTER — OFFICE VISIT (OUTPATIENT)
Dept: FAMILY MEDICINE CLINIC | Facility: CLINIC | Age: 49
End: 2019-05-07

## 2019-05-07 ENCOUNTER — TELEPHONE (OUTPATIENT)
Dept: CARDIOLOGY | Facility: HOSPITAL | Age: 49
End: 2019-05-07

## 2019-05-07 VITALS
DIASTOLIC BLOOD PRESSURE: 72 MMHG | BODY MASS INDEX: 30.75 KG/M2 | HEIGHT: 72 IN | SYSTOLIC BLOOD PRESSURE: 128 MMHG | HEART RATE: 67 BPM | OXYGEN SATURATION: 99 % | WEIGHT: 227 LBS

## 2019-05-07 DIAGNOSIS — I45.10 BUNDLE BRANCH BLOCK, RIGHT: ICD-10-CM

## 2019-05-07 DIAGNOSIS — R07.9 CHEST PAIN, UNSPECIFIED TYPE: Primary | ICD-10-CM

## 2019-05-07 DIAGNOSIS — R53.83 FATIGUE, UNSPECIFIED TYPE: ICD-10-CM

## 2019-05-07 LAB
BASOPHILS # BLD AUTO: 0.03 10*3/MM3 (ref 0–0.2)
BASOPHILS NFR BLD AUTO: 0.5 % (ref 0–1.5)
EOSINOPHIL # BLD AUTO: 0.19 10*3/MM3 (ref 0–0.4)
EOSINOPHIL NFR BLD AUTO: 3.1 % (ref 0.3–6.2)
ERYTHROCYTE [DISTWIDTH] IN BLOOD BY AUTOMATED COUNT: 13.9 % (ref 12.3–15.4)
FERRITIN SERPL-MCNC: 139 NG/ML (ref 30–400)
FOLATE SERPL-MCNC: 8.09 NG/ML (ref 4.78–24.2)
HCT VFR BLD AUTO: 45.1 % (ref 37.5–51)
HGB BLD-MCNC: 14.5 G/DL (ref 13–17.7)
IMM GRANULOCYTES # BLD AUTO: 0.03 10*3/MM3 (ref 0–0.05)
IMM GRANULOCYTES NFR BLD AUTO: 0.5 % (ref 0–0.5)
IRON SERPL-MCNC: 65 MCG/DL (ref 59–158)
LYMPHOCYTES # BLD AUTO: 2.01 10*3/MM3 (ref 0.7–3.1)
LYMPHOCYTES NFR BLD AUTO: 33.1 % (ref 19.6–45.3)
MCH RBC QN AUTO: 29 PG (ref 26.6–33)
MCHC RBC AUTO-ENTMCNC: 32.2 G/DL (ref 31.5–35.7)
MCV RBC AUTO: 90.2 FL (ref 79–97)
MONOCYTES # BLD AUTO: 0.51 10*3/MM3 (ref 0.1–0.9)
MONOCYTES NFR BLD AUTO: 8.4 % (ref 5–12)
NEUTROPHILS # BLD AUTO: 3.31 10*3/MM3 (ref 1.7–7)
NEUTROPHILS NFR BLD AUTO: 54.4 % (ref 42.7–76)
NRBC BLD AUTO-RTO: 0 /100 WBC (ref 0–0.2)
PLATELET # BLD AUTO: 222 10*3/MM3 (ref 140–450)
RBC # BLD AUTO: 5 10*6/MM3 (ref 4.14–5.8)
RETICS/RBC NFR AUTO: 1.39 % (ref 0.7–1.9)
TSH SERPL DL<=0.005 MIU/L-ACNC: 2.12 MIU/ML (ref 0.27–4.2)
VIT B12 SERPL-MCNC: 457 PG/ML (ref 211–946)
WBC # BLD AUTO: 6.08 10*3/MM3 (ref 3.4–10.8)

## 2019-05-07 PROCEDURE — 99214 OFFICE O/P EST MOD 30 MIN: CPT | Performed by: NURSE PRACTITIONER

## 2019-05-07 PROCEDURE — 93000 ELECTROCARDIOGRAM COMPLETE: CPT | Performed by: NURSE PRACTITIONER

## 2019-05-07 NOTE — TELEPHONE ENCOUNTER
05/07/19  3:40 PM  Fred Bhardwaj  1970  Home Phone [phone]   Mobile [phone]       Fred Bhardwaj is a new patient to our office. He calls in today needing an appointment for chest pain and soa. He states that his chest pain has been going on for about 2 months. Stress/Anxiety, caffeine, and lack of sleep make it worse. Most of the time he says resting will make it better. He states that it is a dull pain that sometimes will radiate to his arms and back along with accompanied numbness. He says the pain does not radiate to his jaw.     I scheduled him for next Monday with Dr. Ahn at 930. Also instructed patient to go to the ER if symptoms worsen.     Thanks  SW

## 2019-05-07 NOTE — PROGRESS NOTES
Subjective Ray states that he had a sleep study done last month, he was dx with sleep apnea. The last few months he has had grogginess, fatigue, memory problems, and balance issues. Is seeing a neurologist.  Mr Bhardwaj has been unable to work  Reports that he has had the diagnosis of RBBB for several years and was worked up by the cardiologist  Chest pain daily for all day nothing makes it worse or makes it better.all started months ago.  Fred Bhardwaj is a 49 y.o. male.     History of Present Illness     The following portions of the patient's history were reviewed and updated as appropriate: allergies, current medications, past family history, past medical history, past social history, past surgical history and problem list.    Review of Systems   Constitutional: Positive for fatigue.   Respiratory: Positive for shortness of breath. Negative for cough and choking.    Cardiovascular: Positive for chest pain. Negative for palpitations and leg swelling.   Psychiatric/Behavioral: Positive for sleep disturbance.       Objective   Physical Exam   Constitutional: He is oriented to person, place, and time. He appears well-developed and well-nourished. He appears distressed.   HENT:   Head: Normocephalic.   Right Ear: External ear normal.   Eyes: EOM are normal. Pupils are equal, round, and reactive to light.   Cardiovascular: Normal rate, regular rhythm and normal heart sounds.   Pulmonary/Chest: Effort normal and breath sounds normal.   Musculoskeletal: Normal range of motion.   Neurological: He is alert and oriented to person, place, and time.   Psychiatric: His mood appears anxious.   Nursing note and vitals reviewed.      Assessment/Plan   Fred was seen today for chest pain.    Diagnoses and all orders for this visit:    Chest pain, unspecified type  -     ECG 12 Lead  -     Ambulatory Referral to Cardiology    Fatigue, unspecified type  -     Iron  -     Vitamin B12 and Folate  -     Ferritin  -     CBC and  Differential  -     Reticulocytes  -     TSH    Bundle branch block, right          ECG 12 Lead  Date/Time: 5/9/2019 3:10 PM  Performed by: Renita Fischer APRN  Authorized by: Renita Fischer APRN   Comparison: not compared with previous ECG   Rhythm: sinus bradycardia  Rate: bradycardic  Conduction: right bundle branch block  QRS axis: normal  Comments: Will send to cardiology for an evaluation

## 2019-05-10 ENCOUNTER — DOCUMENTATION (OUTPATIENT)
Dept: CARDIOLOGY | Facility: CLINIC | Age: 49
End: 2019-05-10

## 2019-05-13 ENCOUNTER — OFFICE VISIT (OUTPATIENT)
Dept: CARDIOLOGY | Facility: CLINIC | Age: 49
End: 2019-05-13

## 2019-05-13 VITALS
HEART RATE: 55 BPM | SYSTOLIC BLOOD PRESSURE: 102 MMHG | WEIGHT: 230 LBS | OXYGEN SATURATION: 99 % | HEIGHT: 72 IN | DIASTOLIC BLOOD PRESSURE: 60 MMHG | BODY MASS INDEX: 31.15 KG/M2

## 2019-05-13 DIAGNOSIS — I20.8 OTHER FORMS OF ANGINA PECTORIS (HCC): Primary | ICD-10-CM

## 2019-05-13 PROBLEM — I20.89 OTHER FORMS OF ANGINA PECTORIS: Status: ACTIVE | Noted: 2019-05-13

## 2019-05-13 PROCEDURE — 99204 OFFICE O/P NEW MOD 45 MIN: CPT | Performed by: INTERNAL MEDICINE

## 2019-05-13 NOTE — PROGRESS NOTES
Subjective:     Encounter Date:05/13/2019      Patient ID: Fred Bhardwaj is a 49 y.o. male.    Chief Complaint:  Chest Pain    This is a new problem. The current episode started more than 1 month ago. Associated symptoms include shortness of breath.   Shortness of Breath   Associated symptoms include chest pain.   Fatigue   Associated symptoms include chest pain and fatigue.       49-year-old gentleman who presents today for evaluation.  Patient has been having intermittent episodes of chest pressure for the past 2 months as well as shortness of breath both with exertion and at rest.  Patient had significant benign positional vertigo to the point where he had to quit his job.  Patient was recently diagnosed with sleep apnea and had a CPAP for 9 days.  He says that he was wearing it throughout the night and is tolerating extremely well.  Patient says sometimes the chest discomfort is worse if he drinks a little extra caffeinated beverage.  Patient drinks 1 pint of green tea in the morning every day.      Review of Systems   Constitution: Positive for fatigue.   Cardiovascular: Positive for chest pain.   Respiratory: Positive for shortness of breath.    All other systems reviewed and are negative.      Procedures       Objective:     Physical Exam   Constitutional: He is oriented to person, place, and time. He appears well-developed.   HENT:   Head: Normocephalic.   Eyes: Conjunctivae are normal.   Neck: Normal range of motion.   Cardiovascular: Normal rate, regular rhythm and normal heart sounds.   Pulmonary/Chest: Breath sounds normal.   Abdominal: Soft. Bowel sounds are normal.   Musculoskeletal: Normal range of motion. He exhibits no edema.   Neurological: He is alert and oriented to person, place, and time.   Skin: Skin is warm and dry.   Psychiatric: He has a normal mood and affect. His behavior is normal.   Vitals reviewed.      Lab Review:       Assessment:          Diagnosis Plan   1. Other forms of  angina pectoris (CMS/HCC)  Adult Transthoracic Echo Complete W/ Cont if Necessary Per Protocol    Stress Test With Myocardial Perfusion One Day          Plan:         1.  Very pleasant 49-year-old gentleman with multiple complaints of shortness of breath chest discomfort as well as intermittent dizziness.  Clearly patient has sleep apnea and is currently being treated since he is tolerating his machine so well obviously he needed it pretty significantly.  He does carry a cane says that when he is out in public sometimes a dizziness just comes on.  He was diagnosed with a right bundle branch block about 10 years ago was worked up at that time with no significant findings.  In light of that I am going to set him up for a nuclear perfusion study done by pharmacologic stress so he does not fall off a treadmill as well as an echocardiogram.  I did review that he needs to come off his green tea it could cause a lot of the symptoms.  We will see what his studies show have him follow-up in several months after he is weaned himself off his greent and has been on his CPAP for more prolonged period of time.

## 2019-05-20 ENCOUNTER — HOSPITAL ENCOUNTER (OUTPATIENT)
Dept: CARDIOLOGY | Facility: HOSPITAL | Age: 49
Discharge: HOME | End: 2019-05-20

## 2019-05-20 ENCOUNTER — HOSPITAL ENCOUNTER (OUTPATIENT)
Dept: CARDIOLOGY | Facility: HOSPITAL | Age: 49
Discharge: HOME | End: 2019-05-20
Admitting: INTERNAL MEDICINE

## 2019-05-20 VITALS — HEIGHT: 72 IN | BODY MASS INDEX: 31.05 KG/M2 | WEIGHT: 229.28 LBS

## 2019-05-20 VITALS
HEIGHT: 72 IN | DIASTOLIC BLOOD PRESSURE: 68 MMHG | HEART RATE: 50 BPM | SYSTOLIC BLOOD PRESSURE: 110 MMHG | BODY MASS INDEX: 31.15 KG/M2 | WEIGHT: 230 LBS

## 2019-05-20 DIAGNOSIS — I20.8 OTHER FORMS OF ANGINA PECTORIS (HCC): ICD-10-CM

## 2019-05-20 LAB
BH CV ECHO MEAS - ACS: 2.2 CM
BH CV ECHO MEAS - AO MAX PG (FULL): 1.9 MMHG
BH CV ECHO MEAS - AO MAX PG: 8.6 MMHG
BH CV ECHO MEAS - AO MEAN PG (FULL): 0.8 MMHG
BH CV ECHO MEAS - AO MEAN PG: 4.8 MMHG
BH CV ECHO MEAS - AO ROOT AREA (BSA CORRECTED): 1.7
BH CV ECHO MEAS - AO ROOT AREA: 11.6 CM^2
BH CV ECHO MEAS - AO ROOT DIAM: 3.8 CM
BH CV ECHO MEAS - AO V2 MAX: 146.4 CM/SEC
BH CV ECHO MEAS - AO V2 MEAN: 101.1 CM/SEC
BH CV ECHO MEAS - AO V2 VTI: 32.7 CM
BH CV ECHO MEAS - AVA(I,A): 2.9 CM^2
BH CV ECHO MEAS - AVA(I,D): 2.9 CM^2
BH CV ECHO MEAS - AVA(V,A): 2.8 CM^2
BH CV ECHO MEAS - AVA(V,D): 2.8 CM^2
BH CV ECHO MEAS - BSA(HAYCOCK): 2.3 M^2
BH CV ECHO MEAS - BSA: 2.3 M^2
BH CV ECHO MEAS - BZI_BMI: 31.2 KILOGRAMS/M^2
BH CV ECHO MEAS - BZI_METRIC_HEIGHT: 182.9 CM
BH CV ECHO MEAS - BZI_METRIC_WEIGHT: 104.3 KG
BH CV ECHO MEAS - EDV(MOD-SP2): 93 ML
BH CV ECHO MEAS - EDV(MOD-SP4): 91 ML
BH CV ECHO MEAS - EDV(TEICH): 133.1 ML
BH CV ECHO MEAS - EF(CUBED): 77.8 %
BH CV ECHO MEAS - EF(MOD-BP): 64 %
BH CV ECHO MEAS - EF(MOD-SP2): 62.4 %
BH CV ECHO MEAS - EF(MOD-SP4): 64.8 %
BH CV ECHO MEAS - EF(TEICH): 69.5 %
BH CV ECHO MEAS - ESV(MOD-SP2): 35 ML
BH CV ECHO MEAS - ESV(MOD-SP4): 32 ML
BH CV ECHO MEAS - ESV(TEICH): 40.6 ML
BH CV ECHO MEAS - FS: 39.4 %
BH CV ECHO MEAS - IVS/LVPW: 1
BH CV ECHO MEAS - IVSD: 1 CM
BH CV ECHO MEAS - LAT PEAK E' VEL: 12 CM/SEC
BH CV ECHO MEAS - LV DIASTOLIC VOL/BSA (35-75): 40.3 ML/M^2
BH CV ECHO MEAS - LV MASS(C)D: 207.9 GRAMS
BH CV ECHO MEAS - LV MASS(C)DI: 92 GRAMS/M^2
BH CV ECHO MEAS - LV MAX PG: 6.7 MMHG
BH CV ECHO MEAS - LV MEAN PG: 4 MMHG
BH CV ECHO MEAS - LV SYSTOLIC VOL/BSA (12-30): 14.2 ML/M^2
BH CV ECHO MEAS - LV V1 MAX: 129 CM/SEC
BH CV ECHO MEAS - LV V1 MEAN: 93.7 CM/SEC
BH CV ECHO MEAS - LV V1 VTI: 29.7 CM
BH CV ECHO MEAS - LVIDD: 5.3 CM
BH CV ECHO MEAS - LVIDS: 3.2 CM
BH CV ECHO MEAS - LVLD AP2: 7.4 CM
BH CV ECHO MEAS - LVLD AP4: 7.9 CM
BH CV ECHO MEAS - LVLS AP2: 6.2 CM
BH CV ECHO MEAS - LVLS AP4: 5.9 CM
BH CV ECHO MEAS - LVOT AREA (M): 3.1 CM^2
BH CV ECHO MEAS - LVOT AREA: 3.2 CM^2
BH CV ECHO MEAS - LVOT DIAM: 2 CM
BH CV ECHO MEAS - LVPWD: 1 CM
BH CV ECHO MEAS - MED PEAK E' VEL: 9 CM/SEC
BH CV ECHO MEAS - MV A DUR: 0.11 SEC
BH CV ECHO MEAS - MV A MAX VEL: 64 CM/SEC
BH CV ECHO MEAS - MV DEC SLOPE: 399.1 CM/SEC^2
BH CV ECHO MEAS - MV DEC TIME: 0.2 SEC
BH CV ECHO MEAS - MV E MAX VEL: 103 CM/SEC
BH CV ECHO MEAS - MV E/A: 1.6
BH CV ECHO MEAS - MV MAX PG: 3.8 MMHG
BH CV ECHO MEAS - MV MEAN PG: 1.1 MMHG
BH CV ECHO MEAS - MV P1/2T MAX VEL: 97.9 CM/SEC
BH CV ECHO MEAS - MV P1/2T: 71.9 MSEC
BH CV ECHO MEAS - MV V2 MAX: 97 CM/SEC
BH CV ECHO MEAS - MV V2 MEAN: 42 CM/SEC
BH CV ECHO MEAS - MV V2 VTI: 32.7 CM
BH CV ECHO MEAS - MVA P1/2T LCG: 2.2 CM^2
BH CV ECHO MEAS - MVA(P1/2T): 3.1 CM^2
BH CV ECHO MEAS - MVA(VTI): 2.9 CM^2
BH CV ECHO MEAS - PA MAX PG (FULL): 2.8 MMHG
BH CV ECHO MEAS - PA MAX PG: 4.4 MMHG
BH CV ECHO MEAS - PA V2 MAX: 105.1 CM/SEC
BH CV ECHO MEAS - PULM A REVS DUR: 0.14 SEC
BH CV ECHO MEAS - PULM A REVS VEL: 28.1 CM/SEC
BH CV ECHO MEAS - PULM DIAS VEL: 76.2 CM/SEC
BH CV ECHO MEAS - PULM S/D: 0.76
BH CV ECHO MEAS - PULM SYS VEL: 57.7 CM/SEC
BH CV ECHO MEAS - PVA(V,A): 2.4 CM^2
BH CV ECHO MEAS - PVA(V,D): 2.4 CM^2
BH CV ECHO MEAS - QP/QS: 0.68
BH CV ECHO MEAS - RAP SYSTOLE: 3 MMHG
BH CV ECHO MEAS - RV MAX PG: 1.6 MMHG
BH CV ECHO MEAS - RV MEAN PG: 0.99 MMHG
BH CV ECHO MEAS - RV V1 MAX: 63.5 CM/SEC
BH CV ECHO MEAS - RV V1 MEAN: 47.2 CM/SEC
BH CV ECHO MEAS - RV V1 VTI: 15.8 CM
BH CV ECHO MEAS - RVOT AREA: 4 CM^2
BH CV ECHO MEAS - RVOT DIAM: 2.3 CM
BH CV ECHO MEAS - RVSP: 20 MMHG
BH CV ECHO MEAS - SI(AO): 167.4 ML/M^2
BH CV ECHO MEAS - SI(CUBED): 50.1 ML/M^2
BH CV ECHO MEAS - SI(LVOT): 41.8 ML/M^2
BH CV ECHO MEAS - SI(MOD-SP2): 25.7 ML/M^2
BH CV ECHO MEAS - SI(MOD-SP4): 26.1 ML/M^2
BH CV ECHO MEAS - SI(TEICH): 40.9 ML/M^2
BH CV ECHO MEAS - SUP REN AO DIAM: 1.8 CM
BH CV ECHO MEAS - SV(AO): 378.5 ML
BH CV ECHO MEAS - SV(CUBED): 113.3 ML
BH CV ECHO MEAS - SV(LVOT): 94.5 ML
BH CV ECHO MEAS - SV(MOD-SP2): 58 ML
BH CV ECHO MEAS - SV(MOD-SP4): 59 ML
BH CV ECHO MEAS - SV(RVOT): 63.8 ML
BH CV ECHO MEAS - SV(TEICH): 92.5 ML
BH CV ECHO MEAS - TAPSE (>1.6): 2 CM2
BH CV ECHO MEAS - TR MAX VEL: 207.7 CM/SEC
BH CV ECHO MEASUREMENTS AVERAGE E/E' RATIO: 9.81
BH CV NUCLEAR PRIOR STUDY: 3
BH CV STRESS BP STAGE 1: NORMAL
BH CV STRESS COMMENTS STAGE 1: NORMAL
BH CV STRESS DOSE REGADENOSON STAGE 1: 0.4
BH CV STRESS DURATION MIN STAGE 1: 0
BH CV STRESS DURATION SEC STAGE 1: 10
BH CV STRESS HR STAGE 1: 89
BH CV STRESS PROTOCOL 1: NORMAL
BH CV STRESS RECOVERY BP: NORMAL MMHG
BH CV STRESS RECOVERY HR: 73 BPM
BH CV STRESS STAGE 1: 1
BH CV XLRA - RV BASE: 3.5 CM
BH CV XLRA - TDI S': 14 CM/SEC
LEFT ATRIUM VOLUME INDEX: 25 ML/M2
LEFT ATRIUM VOLUME: 57 CM3
LV EF NUC BP: 63 %
MAXIMAL PREDICTED HEART RATE: 169 BPM
MAXIMAL PREDICTED HEART RATE: 169 BPM
PERCENT MAX PREDICTED HR: 52.66 %
STRESS BASELINE BP: NORMAL MMHG
STRESS BASELINE HR: 52 BPM
STRESS PERCENT HR: 62 %
STRESS POST EXERCISE DUR SEC: 10 SEC
STRESS POST PEAK BP: NORMAL MMHG
STRESS POST PEAK HR: 89 BPM
STRESS TARGET HR: 144 BPM
STRESS TARGET HR: 144 BPM

## 2019-05-20 PROCEDURE — 25010000002 REGADENOSON 0.4 MG/5ML SOLUTION: Performed by: INTERNAL MEDICINE

## 2019-05-20 PROCEDURE — 93018 CV STRESS TEST I&R ONLY: CPT | Performed by: INTERNAL MEDICINE

## 2019-05-20 PROCEDURE — 93306 TTE W/DOPPLER COMPLETE: CPT | Performed by: INTERNAL MEDICINE

## 2019-05-20 PROCEDURE — 0 TECHNETIUM TETROFOSMIN KIT: Performed by: INTERNAL MEDICINE

## 2019-05-20 PROCEDURE — 78452 HT MUSCLE IMAGE SPECT MULT: CPT

## 2019-05-20 PROCEDURE — 93017 CV STRESS TEST TRACING ONLY: CPT

## 2019-05-20 PROCEDURE — 93306 TTE W/DOPPLER COMPLETE: CPT

## 2019-05-20 PROCEDURE — A9502 TC99M TETROFOSMIN: HCPCS | Performed by: INTERNAL MEDICINE

## 2019-05-20 PROCEDURE — 78452 HT MUSCLE IMAGE SPECT MULT: CPT | Performed by: INTERNAL MEDICINE

## 2019-05-20 PROCEDURE — 93016 CV STRESS TEST SUPVJ ONLY: CPT | Performed by: INTERNAL MEDICINE

## 2019-05-20 RX ADMIN — TETROFOSMIN 1 DOSE: 1.38 INJECTION, POWDER, LYOPHILIZED, FOR SOLUTION INTRAVENOUS at 07:50

## 2019-05-20 RX ADMIN — REGADENOSON 0.4 MG: 0.08 INJECTION, SOLUTION INTRAVENOUS at 08:22

## 2019-05-20 RX ADMIN — TETROFOSMIN 1 DOSE: 1.38 INJECTION, POWDER, LYOPHILIZED, FOR SOLUTION INTRAVENOUS at 08:22

## 2019-05-23 ENCOUNTER — TELEPHONE (OUTPATIENT)
Dept: CARDIOLOGY | Facility: CLINIC | Age: 49
End: 2019-05-23

## 2019-05-23 NOTE — TELEPHONE ENCOUNTER
I called reviewed studies.  He has an appointment in August his symptoms have persisted.  Told him to cut his Toprol in half follow his blood pressure and keep his appointment.

## 2019-08-02 ENCOUNTER — OFFICE VISIT (OUTPATIENT)
Dept: SLEEP MEDICINE | Facility: HOSPITAL | Age: 49
End: 2019-08-02

## 2019-08-02 VITALS
DIASTOLIC BLOOD PRESSURE: 68 MMHG | BODY MASS INDEX: 31.94 KG/M2 | HEART RATE: 59 BPM | HEIGHT: 72 IN | OXYGEN SATURATION: 96 % | SYSTOLIC BLOOD PRESSURE: 119 MMHG | WEIGHT: 235.8 LBS

## 2019-08-02 DIAGNOSIS — R56.9 NOCTURNAL SEIZURES (HCC): Primary | ICD-10-CM

## 2019-08-02 DIAGNOSIS — G47.33 OBSTRUCTIVE SLEEP APNEA: ICD-10-CM

## 2019-08-02 PROCEDURE — G0463 HOSPITAL OUTPT CLINIC VISIT: HCPCS

## 2019-08-02 RX ORDER — ZOLPIDEM TARTRATE 5 MG/1
TABLET ORAL
Qty: 2 TABLET | Refills: 0 | Status: SHIPPED | OUTPATIENT
Start: 2019-08-02 | End: 2020-07-02

## 2019-08-02 NOTE — PROGRESS NOTES
[facility] SLEEP MEDICINE  [address] [facility]  3rd Floor  [facility] [address]  [phone]    PCP: Renita Fischer APRN    Reason for visit:  Sleep disorders: EZ    Fred is a 49 y.o.male who was seen in the [facility] today. Setup with CPAP after HST and feels better. Typically sleeps from 12mn to 9:30am. He wakes up rested and refreshed. Using ffm, fits well, minimal leaks, minimal dry mouth. He has restlessness of his limbs - right hand and leg can twitch, lasts few mins then stops - patient unaware, spouse informant. He has chest pain during day, due to gerd, seeing cardiologist also. Has rbbb. Also seeing neurologist for dizziness and headaches. No hx seizures.  [place] Sleepiness Scale is 13. Caffeine 0 per day. Alcohol 0 per week.    Fred  reports that he has never smoked. He has never used smokeless tobacco.    Pertinent Positive Review of Systems of chest pain, anxiety  Rest of Review of Systems was negative as recorded in Sleep Questionnaire.    Patient  has a past medical history of Asthma, BPPV (benign paroxysmal positional vertigo), left, Chest pain, Depression, Fatigue, GERD (gastroesophageal reflux disease), Hypersomnia, Meniere disease, Obesity (BMI 35.0-39.9 without comorbidity), RBBB, Sleep apnea, and Witnessed episode of apnea.     Current Medications:    Current Outpatient Medications:   •  DULoxetine (CYMBALTA) 20 MG capsule, Take 20 mg by mouth Daily., Disp: , Rfl:   •  magnesium oxide (MAG-OX) 400 MG tablet, Take 400 mg by mouth Daily., Disp: , Rfl:   •  metoprolol succinate XL (TOPROL-XL) 200 MG 24 hr tablet, Take 200 mg by mouth., Disp: , Rfl:   •  zolpidem (AMBIEN) 5 MG tablet, Bring to sleep lab DO NOT use at home. PLEASE take if not asleep within 30 mins of start of study., Disp: 2 tablet, Rfl: 0     fluticasone (FLONASE) 50 mcg/actuation nasal spray   Use 1 Spray in the nose as needed.   0     Active   fluticasone-vilanterol (BREO ELLIPTA) 100-25 mcg/dose  "inhaler   Inhale 1 Puff as instructed.   0 03/30/2017   Active   DULoxetine (CYMBALTA) 20 mg capsule   Take 2 capsules by mouth once daily. 60 capsule   5 06/20/2019   Active   magnesium oxide (MAG-OX) 400 mg (241.3 mg magnesium) tablet   Take 1 tablet by mouth once daily.   0 06/24/2019   Active   metoprolol succinate ER (TOPROL XL) 100 mg Tb24   Take 1 tablet by mouth once daily.   0 06/24/2019   Active     also entered in Sleep Questionnaire         Vital Signs: /68 (BP Location: Left arm, Patient Position: Sitting)   Pulse 59   Ht 182.9 cm (72\")   Wt 107 kg (235 lb 12.8 oz)   SpO2 96%   BMI 31.98 kg/m²     Body mass index is 31.98 kg/m².       Tongue: large      Dentition: good       Pharynx: Posterior pharyngeal pillars are wide   Mallampatti: III (soft and hard palate and base of uvula visible)        General: Alert. Cooperative. Well developed. No acute distress.             Head:  Normocephalic. Symmetrical. Atraumatic.              Nose: No septal deviation. No drainage.          Throat: No oral lesions. No thrush. Moist mucous membranes.    Chest Wall:  Normal shape. Symmetric expansion with respiration. No tenderness.             Neck:  Trachea midline.           Lungs:  Clear to auscultation bilaterally. No wheezes. No rhonchi. No rales. Respirations regular, even and unlabored.            Heart:  Regular rhythm and normal rate. Normal S1 and S2. No murmur.     Abdomen:  Soft, non-tender and non-distended. Normal bowel sounds. No masses.  Extremities:  Moves all extremities well. No edema.    Psychiatric: Normal mood and affect.    Study:  · 4/19/19  The patient tolerated the home sleep testing with monitoring time of 509 minutes. The data obtained make this a technically adequate study. The apnea hypopneas index(AHI) was 8 per sleep hour.  The AHI during supine position was 12.5 per sleep hour. Mean heart rate of 48 BPM.  Snoring was noted 18% of sleep time. Lowest oxygen saturation during " the study was 72%. Saturation below 89% was noted for 5 mins.     Testing:  · Compliant 100% average usage 10 hours 30 minutes AHI 2.4 average pressure 10.9 auto CPAP between 5 and 20 cm    DME Company: [employer]    Impression:  1. Nocturnal seizures (CMS/HCC)    2. Obstructive sleep apnea        Plan:  Fred is compliant and using his CPAP machine regularly.  He seems to benefit from it and wakes up more rested.  His pressures are appropriate at this time he is tolerating it well.    Twitching could be plmd/rls but need to r/o sz given multiple neurological issues with headaches dizziness and gait imbalance.  I have therefore ordered a nocturnal study with CPAP with seizure montage.  I explained to the patient the need for this study.  I have written Ambien as he reports difficulty sleeping at night.    I reiterated the importance of effective treatment of obstructive sleep apnea with PAP machine.  Cardiovascular health risks of untreated sleep apnea were again reviewed.  Patient was asked to remain cautious if there is persistent hypersomnolence. The benefit of weight loss in reducing severity of obstructive sleep apnea was discussed.  Patient would benefit from adhering to a strict diet to achieve ideal BMI.     Change of PAP supplies regularly is important for effective use.  Change of cushion on the mask or plugs on nasal pillows along with disposable filters once every month and change of mask frame, tubing, headgear and Velcro straps every 6 months at the minimum was reiterated.    This patient is compliant with PAP machine and benefits from its use.  Apnea hypopneas index is corrected/improved.  Daytime hypersomnolence has resolved.     Patient will follow up in this clinic in pending test results.    Thank you for allowing me to participate in your patient's care.    Bg Yoon MD    Part of this note may be an electronic transcription/translation of spoken language to printed text using the Dragon  Dictation System.

## 2020-03-17 ENCOUNTER — TELEPHONE (OUTPATIENT)
Dept: FAMILY MEDICINE CLINIC | Facility: CLINIC | Age: 50
End: 2020-03-17

## 2020-03-17 NOTE — TELEPHONE ENCOUNTER
Spoke with pt gave him the telephone number to [facility]----- Message from YESSICA Mercado sent at 3/16/2020  1:45 PM EDT -----  Renita if he wants to be tested which is what I think he is asking have him call the [facility] in [place] and speak with them [phone]  Thanks    ----- Message -----  From: Renita Cornelius  Sent: 3/16/2020   1:14 PM EDT  To: YESSICA Mercado    Patient has schedule himself through [facility] for tomorrow, please look at the reason and see if this should be rescheduled?

## 2020-07-02 ENCOUNTER — OFFICE VISIT (OUTPATIENT)
Dept: FAMILY MEDICINE CLINIC | Facility: CLINIC | Age: 50
End: 2020-07-02

## 2020-07-02 VITALS
SYSTOLIC BLOOD PRESSURE: 144 MMHG | OXYGEN SATURATION: 98 % | HEIGHT: 72 IN | BODY MASS INDEX: 32.1 KG/M2 | RESPIRATION RATE: 16 BRPM | WEIGHT: 237 LBS | DIASTOLIC BLOOD PRESSURE: 90 MMHG | HEART RATE: 80 BPM | TEMPERATURE: 97.5 F

## 2020-07-02 DIAGNOSIS — R06.02 SHORTNESS OF BREATH: Primary | ICD-10-CM

## 2020-07-02 PROCEDURE — 99213 OFFICE O/P EST LOW 20 MIN: CPT | Performed by: NURSE PRACTITIONER

## 2020-07-02 RX ORDER — ALBUTEROL SULFATE 90 UG/1
2 AEROSOL, METERED RESPIRATORY (INHALATION) EVERY 4 HOURS PRN
Qty: 1 INHALER | Refills: 0 | Status: SHIPPED | OUTPATIENT
Start: 2020-07-02

## 2020-07-02 NOTE — PATIENT INSTRUCTIONS
Shortness of Breath, Adult  Shortness of breath is when a person has trouble breathing enough air or when a person feels like she or he is having trouble breathing in enough air. Shortness of breath could be a sign of a medical problem.  Follow these instructions at home:    · Pay attention to any changes in your symptoms.  · Do not use any products that contain nicotine or tobacco, such as cigarettes, e-cigarettes, and chewing tobacco.  · Do not smoke. Smoking is a common cause of shortness of breath. If you need help quitting, ask your health care provider.  · Avoid things that can irritate your airways, such as:  ? Mold.  ? Dust.  ? Air pollution.  ? Chemical fumes.  ? Things that can cause allergy symptoms (allergens), if you have allergies.  · Keep your living space clean and free of mold and dust.  · Rest as needed. Slowly return to your usual activities.  · Take over-the-counter and prescription medicines only as told by your health care provider. This includes oxygen therapy and inhaled medicines.  · Keep all follow-up visits as told by your health care provider. This is important.  Contact a health care provider if:  · Your condition does not improve as soon as expected.  · You have a hard time doing your normal activities, even after you rest.  · You have new symptoms.  Get help right away if:  · Your shortness of breath gets worse.  · You have shortness of breath when you are resting.  · You feel light-headed or you faint.  · You have a cough that is not controlled with medicines.  · You cough up blood.  · You have pain with breathing.  · You have pain in your chest, arms, shoulders, or abdomen.  · You have a fever.  · You cannot walk up stairs or exercise the way that you normally do.  These symptoms may represent a serious problem that is an emergency. Do not wait to see if the symptoms will go away. Get medical help right away. Call your local emergency services (911 in the [place] Do not drive yourself  to the hospital.  Summary  · Shortness of breath is when a person has trouble breathing enough air. It can be a sign of a medical problem.  · Avoid things that irritate your lungs, such as smoking, pollution, mold, and dust.  · Pay attention to changes in your symptoms and contact your health care provider if you have a hard time completing daily activities because of shortness of breath.  This information is not intended to replace advice given to you by your health care provider. Make sure you discuss any questions you have with your health care provider.  Document Released: 09/12/2002 Document Revised: 05/20/2019 Document Reviewed: 05/20/2019  Elsevier Patient Education © 2020 [employer]

## 2021-03-26 ENCOUNTER — BULK ORDERING (OUTPATIENT)
Dept: CASE MANAGEMENT | Facility: OTHER | Age: 51
End: 2021-03-26

## 2021-03-26 DIAGNOSIS — Z23 IMMUNIZATION DUE: ICD-10-CM

## 2022-08-18 ENCOUNTER — OFFICE VISIT (OUTPATIENT)
Dept: FAMILY MEDICINE CLINIC | Facility: CLINIC | Age: 52
End: 2022-08-18

## 2022-08-18 VITALS
SYSTOLIC BLOOD PRESSURE: 128 MMHG | HEART RATE: 81 BPM | DIASTOLIC BLOOD PRESSURE: 78 MMHG | WEIGHT: 218 LBS | OXYGEN SATURATION: 97 % | BODY MASS INDEX: 29.53 KG/M2 | HEIGHT: 72 IN

## 2022-08-18 DIAGNOSIS — F32.A ANXIETY AND DEPRESSION: ICD-10-CM

## 2022-08-18 DIAGNOSIS — Z12.5 SCREENING FOR MALIGNANT NEOPLASM OF PROSTATE: ICD-10-CM

## 2022-08-18 DIAGNOSIS — Z11.59 ENCOUNTER FOR HEPATITIS C SCREENING TEST FOR LOW RISK PATIENT: ICD-10-CM

## 2022-08-18 DIAGNOSIS — G43.809 VESTIBULAR MIGRAINE: ICD-10-CM

## 2022-08-18 DIAGNOSIS — I10 ESSENTIAL HYPERTENSION: ICD-10-CM

## 2022-08-18 DIAGNOSIS — Z00.00 PREVENTATIVE HEALTH CARE: Primary | ICD-10-CM

## 2022-08-18 DIAGNOSIS — Z13.228 SCREENING FOR METABOLIC DISORDER: ICD-10-CM

## 2022-08-18 DIAGNOSIS — R73.9 ELEVATED BLOOD SUGAR: ICD-10-CM

## 2022-08-18 DIAGNOSIS — Z13.220 SCREENING, LIPID: ICD-10-CM

## 2022-08-18 DIAGNOSIS — R35.0 URINARY FREQUENCY: ICD-10-CM

## 2022-08-18 DIAGNOSIS — F41.9 ANXIETY AND DEPRESSION: ICD-10-CM

## 2022-08-18 DIAGNOSIS — Z12.11 COLON CANCER SCREENING: ICD-10-CM

## 2022-08-18 PROBLEM — G43.109 OPHTHALMIC MIGRAINE: Status: ACTIVE | Noted: 2021-03-23

## 2022-08-18 PROBLEM — G43.909 MIGRAINE, UNSPECIFIED, NOT INTRACTABLE, WITHOUT STATUS MIGRAINOSUS: Status: ACTIVE | Noted: 2019-11-21

## 2022-08-18 PROBLEM — S29.019A STRAIN OF THORACIC REGION: Status: ACTIVE | Noted: 2022-08-18

## 2022-08-18 PROBLEM — G47.33 OBSTRUCTIVE SLEEP APNEA: Status: ACTIVE | Noted: 2019-08-28

## 2022-08-18 PROBLEM — H52.13 BILATERAL MYOPIA: Status: ACTIVE | Noted: 2021-03-23

## 2022-08-18 LAB
BILIRUB BLD-MCNC: NEGATIVE MG/DL
CLARITY, POC: CLEAR
COLOR UR: YELLOW
GLUCOSE UR STRIP-MCNC: NEGATIVE MG/DL
KETONES UR QL: NEGATIVE
LEUKOCYTE EST, POC: NEGATIVE
NITRITE UR-MCNC: NEGATIVE MG/ML
PH UR: 6.5 [PH] (ref 5–8)
PROT UR STRIP-MCNC: NEGATIVE MG/DL
RBC # UR STRIP: NEGATIVE /UL
SP GR UR: 1 (ref 1–1.03)
UROBILINOGEN UR QL: NORMAL

## 2022-08-18 PROCEDURE — 99386 PREV VISIT NEW AGE 40-64: CPT | Performed by: NURSE PRACTITIONER

## 2022-08-18 PROCEDURE — 81002 URINALYSIS NONAUTO W/O SCOPE: CPT | Performed by: NURSE PRACTITIONER

## 2022-08-18 RX ORDER — MULTIPLE VITAMINS W/ MINERALS TAB 9MG-400MCG
1 TAB ORAL DAILY
COMMUNITY

## 2022-08-18 RX ORDER — HYDROCHLOROTHIAZIDE 25 MG/1
25 TABLET ORAL DAILY
COMMUNITY
Start: 2022-05-15

## 2022-08-18 RX ORDER — OMEPRAZOLE 20 MG/1
20 TABLET, DELAYED RELEASE ORAL DAILY
Qty: 30 TABLET | Refills: 2 | Status: SHIPPED | OUTPATIENT
Start: 2022-08-18 | End: 2022-09-17

## 2022-08-18 NOTE — PROGRESS NOTES
Subjective   Fred Bhardwaj is a 52 y.o. male.     History of Present Illness   New patient here to establish care.     Here for concerns about Dm2, a1c 5.6. he is having some symptoms of urinary frequency with eating or drinking sugary beverages. He has a clean diet. He has lost 40 lb in the past year with diet and exercise.     Chronic dizziness > 5 years. He cannot work or dive. Follows with Dr Schafer for vestibular migraines. He has dizziness and uses cane for walking. Working with PT. Stimuli for headaches: sunlight, lack of sleep, anxiety. He uses naproxen as needed.     Chronic depression and anxiety x years. He is not medicated, previously on Cymbalta for migraines which did not help with depression. He was prev on prozac which he did not like and possibly on zoloft that was somewhat helpful.   He has seen therapist in 2002. PHQ-9 Depression Screening  Little interest or pleasure in doing things? 1-->several days   Feeling down, depressed, or hopeless? 1-->several days   Trouble falling or staying asleep, or sleeping too much? 2-->more than half the days   Feeling tired or having little energy? 2-->more than half the days   Poor appetite or overeating? 0-->not at all   Feeling bad about yourself - or that you are a failure or have let yourself or your family down? 2-->more than half the days   Trouble concentrating on things, such as reading the newspaper or watching television? 3-->nearly every day   Moving or speaking so slowly that other people could have noticed? Or the opposite - being so fidgety or restless that you have been moving around a lot more than usual? 1-->several days   Thoughts that you would be better off dead, or of hurting yourself in some way? 1-->several days   PHQ-9 Total Score 13   If you checked off any problems, how difficult have these problems made it for you to do your work, take care of things at home, or get along with other people? somewhat difficult       The following  portions of the patient's history were reviewed and updated as appropriate: allergies, current medications, past family history, past medical history, past social history, past surgical history and problem list.    Review of Systems   Constitutional: Negative for activity change, fatigue and unexpected weight loss.   HENT: Negative for congestion.         Dental exam is due    Eyes: Negative for visual disturbance.        Eye exam is tomorrow    Respiratory: Negative for cough, shortness of breath and wheezing.    Cardiovascular: Negative for chest pain, palpitations and leg swelling.   Gastrointestinal: Positive for GERD. Negative for abdominal distention, constipation and diarrhea.   Endocrine: Positive for polyuria. Negative for cold intolerance, heat intolerance, polydipsia and polyphagia.   Genitourinary: Negative for dysuria, hematuria, scrotal swelling and urinary incontinence.   Musculoskeletal: Positive for arthralgias (knees, chronic ).   Skin: Negative for rash.   Allergic/Immunologic: Negative for environmental allergies.   Neurological: Positive for dizziness. Negative for weakness.   Hematological: Does not bruise/bleed easily.   Psychiatric/Behavioral: Positive for sleep disturbance and depressed mood. The patient is nervous/anxious.        Objective   Physical Exam  Vitals reviewed.   Constitutional:       Appearance: Normal appearance. He is normal weight.   HENT:      Head: Normocephalic.      Right Ear: Tympanic membrane normal.      Left Ear: Tympanic membrane normal.      Nose: Nose normal.      Mouth/Throat:      Mouth: Mucous membranes are moist.   Eyes:      Pupils: Pupils are equal, round, and reactive to light.   Cardiovascular:      Rate and Rhythm: Normal rate and regular rhythm.      Pulses: Normal pulses.      Heart sounds: Normal heart sounds.   Pulmonary:      Effort: Pulmonary effort is normal.      Breath sounds: Normal breath sounds.   Abdominal:      General: Abdomen is flat.  Bowel sounds are normal.      Palpations: Abdomen is soft.   Musculoskeletal:         General: Normal range of motion.      Cervical back: Normal range of motion.   Skin:     General: Skin is warm.   Neurological:      General: No focal deficit present.      Mental Status: He is alert.   Psychiatric:         Mood and Affect: Mood is anxious and depressed.         Vitals:    08/18/22 1252   BP: 128/78   Pulse: 81   SpO2: 97%     Body mass index is 29.57 kg/m².    Procedures    Assessment & Plan   Problems Addressed this Visit        Endocrine and Metabolic    Elevated blood sugar    Relevant Orders    Hemoglobin A1c       Mental Health    Anxiety and depression       Neuro    Vestibular migraine      Other Visit Diagnoses     Preventative health care    -  Primary    Encounter for hepatitis C screening test for low risk patient        Relevant Orders    Hepatitis C Antibody    Screening, lipid        Relevant Orders    Lipid Panel With / Chol / HDL Ratio    Screening for malignant neoplasm of prostate        Relevant Orders    PSA Screen    Urinary frequency        Relevant Orders    POC Urinalysis Dipstick (Completed)    Screening for metabolic disorder        Relevant Orders    Comprehensive Metabolic Panel    CBC & Differential    TSH    Colon cancer screening        Relevant Orders    Cologuard - Stool, Per Rectum    Essential hypertension        Relevant Medications    hydroCHLOROthiazide (HYDRODIURIL) 25 MG tablet      Diagnoses       Codes Comments    Preventative health care    -  Primary ICD-10-CM: Z00.00  ICD-9-CM: V70.0     Encounter for hepatitis C screening test for low risk patient     ICD-10-CM: Z11.59  ICD-9-CM: V73.89     Screening, lipid     ICD-10-CM: Z13.220  ICD-9-CM: V77.91     Screening for malignant neoplasm of prostate     ICD-10-CM: Z12.5  ICD-9-CM: V76.44     Urinary frequency     ICD-10-CM: R35.0  ICD-9-CM: 788.41     Elevated blood sugar     ICD-10-CM: R73.9  ICD-9-CM: 790.29      Screening for metabolic disorder     ICD-10-CM: Z13.228  ICD-9-CM: V77.99     Colon cancer screening     ICD-10-CM: Z12.11  ICD-9-CM: V76.51     Anxiety and depression     ICD-10-CM: F41.9, F32.A  ICD-9-CM: 300.00, 311     Essential hypertension     ICD-10-CM: I10  ICD-9-CM: 401.9     Vestibular migraine     ICD-10-CM: G43.809  ICD-9-CM: 346.80         Orders Placed This Encounter   Procedures   • Comprehensive Metabolic Panel     Order Specific Question:   Release to patient     Answer:   Routine Release   • Hepatitis C Antibody     Order Specific Question:   Release to patient     Answer:   Routine Release   • Lipid Panel With / Chol / HDL Ratio     Order Specific Question:   Release to patient     Answer:   Routine Release   • TSH     Order Specific Question:   Release to patient     Answer:   Routine Release   • Hemoglobin A1c     Order Specific Question:   Release to patient     Answer:   Routine Release   • PSA Screen     Order Specific Question:   Release to patient     Answer:   Routine Release   • Cologuard - Stool, Per Rectum     Standing Status:   Future     Standing Expiration Date:   8/18/2023     Order Specific Question:   Release to patient     Answer:   Routine Release   • POC Urinalysis Dipstick     Order Specific Question:   Release to patient     Answer:   Routine Release   • CBC & Differential       Preventative care- Follow heart healthy diet, drink water, walk daily. Wear seatbelts, wear helmets, wear sunscreens. Follow CDC guidelines for covid pandemic.     Elevated blood sugar- start cinnamon 1 cap bid, limit sweets and highly refined carbs, check labs and urine    Urinary freq- check urine.    Depression anxiety encouraged talk therapy, physical activity, heart healthy diet.  Patient wants to avoid medication at this time.  Check labs  Hypertension-continue with HCTZ 25 daily, follow heart healthy diet, limit sodium and increase activity.  Limit stressors and stimulants.  Check labs       Vestibular migraine-follow-up with Dr. Schafer and continue with physical therapy.  Reviewed falls risks and use of cane.  Hydrate.  Check labs  Education provided in AVS   Return in about 1 year (around 8/18/2023) for Annual.

## 2022-08-19 LAB
ALBUMIN SERPL-MCNC: 4.7 G/DL (ref 3.8–4.9)
ALBUMIN/GLOB SERPL: 1.8 {RATIO} (ref 1.2–2.2)
ALP SERPL-CCNC: 78 IU/L (ref 44–121)
ALT SERPL-CCNC: 25 IU/L (ref 0–44)
AST SERPL-CCNC: 27 IU/L (ref 0–40)
BASOPHILS # BLD AUTO: 0 X10E3/UL (ref 0–0.2)
BASOPHILS NFR BLD AUTO: 1 %
BILIRUB SERPL-MCNC: 0.3 MG/DL (ref 0–1.2)
BUN SERPL-MCNC: 14 MG/DL (ref 6–24)
BUN/CREAT SERPL: 15 (ref 9–20)
CALCIUM SERPL-MCNC: 9.7 MG/DL (ref 8.7–10.2)
CHLORIDE SERPL-SCNC: 100 MMOL/L (ref 96–106)
CHOLEST SERPL-MCNC: 152 MG/DL (ref 100–199)
CHOLEST/HDLC SERPL: 3 RATIO (ref 0–5)
CO2 SERPL-SCNC: 22 MMOL/L (ref 20–29)
CREAT SERPL-MCNC: 0.94 MG/DL (ref 0.76–1.27)
EGFRCR-CYS SERPLBLD CKD-EPI 2021: 96 ML/MIN/1.73
EOSINOPHIL # BLD AUTO: 0.2 X10E3/UL (ref 0–0.4)
EOSINOPHIL NFR BLD AUTO: 4 %
ERYTHROCYTE [DISTWIDTH] IN BLOOD BY AUTOMATED COUNT: 13.5 % (ref 11.6–15.4)
GLOBULIN SER CALC-MCNC: 2.6 G/DL (ref 1.5–4.5)
GLUCOSE SERPL-MCNC: 84 MG/DL (ref 65–99)
HBA1C MFR BLD: 5.5 % (ref 4.8–5.6)
HCT VFR BLD AUTO: 48 % (ref 37.5–51)
HCV AB S/CO SERPL IA: <0.1 S/CO RATIO (ref 0–0.9)
HDLC SERPL-MCNC: 50 MG/DL
HGB BLD-MCNC: 15.8 G/DL (ref 13–17.7)
IMM GRANULOCYTES # BLD AUTO: 0 X10E3/UL (ref 0–0.1)
IMM GRANULOCYTES NFR BLD AUTO: 0 %
LDLC SERPL CALC-MCNC: 86 MG/DL (ref 0–99)
LYMPHOCYTES # BLD AUTO: 1.7 X10E3/UL (ref 0.7–3.1)
LYMPHOCYTES NFR BLD AUTO: 34 %
MCH RBC QN AUTO: 29.6 PG (ref 26.6–33)
MCHC RBC AUTO-ENTMCNC: 32.9 G/DL (ref 31.5–35.7)
MCV RBC AUTO: 90 FL (ref 79–97)
MONOCYTES # BLD AUTO: 0.4 X10E3/UL (ref 0.1–0.9)
MONOCYTES NFR BLD AUTO: 7 %
NEUTROPHILS # BLD AUTO: 2.7 X10E3/UL (ref 1.4–7)
NEUTROPHILS NFR BLD AUTO: 54 %
PLATELET # BLD AUTO: 206 X10E3/UL (ref 150–450)
POTASSIUM SERPL-SCNC: 4 MMOL/L (ref 3.5–5.2)
PROT SERPL-MCNC: 7.3 G/DL (ref 6–8.5)
PSA SERPL-MCNC: 2.8 NG/ML (ref 0–4)
RBC # BLD AUTO: 5.34 X10E6/UL (ref 4.14–5.8)
SODIUM SERPL-SCNC: 139 MMOL/L (ref 134–144)
TRIGL SERPL-MCNC: 82 MG/DL (ref 0–149)
TSH SERPL DL<=0.005 MIU/L-ACNC: 1.84 UIU/ML (ref 0.45–4.5)
VLDLC SERPL CALC-MCNC: 16 MG/DL (ref 5–40)
WBC # BLD AUTO: 5 X10E3/UL (ref 3.4–10.8)

## 2023-07-24 ENCOUNTER — TELEPHONE (OUTPATIENT)
Dept: FAMILY MEDICINE CLINIC | Facility: CLINIC | Age: 53
End: 2023-07-24
Payer: COMMERCIAL

## 2023-07-24 ENCOUNTER — APPOINTMENT (OUTPATIENT)
Dept: CT IMAGING | Facility: HOSPITAL | Age: 53
End: 2023-07-24
Payer: COMMERCIAL

## 2023-07-24 ENCOUNTER — HOSPITAL ENCOUNTER (EMERGENCY)
Facility: HOSPITAL | Age: 53
Discharge: HOME | End: 2023-07-24
Attending: EMERGENCY MEDICINE | Admitting: EMERGENCY MEDICINE
Payer: COMMERCIAL

## 2023-07-24 VITALS
SYSTOLIC BLOOD PRESSURE: 141 MMHG | WEIGHT: 218 LBS | HEART RATE: 63 BPM | RESPIRATION RATE: 17 BRPM | HEIGHT: 72 IN | BODY MASS INDEX: 29.53 KG/M2 | OXYGEN SATURATION: 93 % | TEMPERATURE: 99.2 F | DIASTOLIC BLOOD PRESSURE: 92 MMHG

## 2023-07-24 DIAGNOSIS — K62.5 BRIGHT RED BLOOD PER RECTUM: Primary | ICD-10-CM

## 2023-07-24 DIAGNOSIS — R10.30 LOWER ABDOMINAL PAIN: ICD-10-CM

## 2023-07-24 LAB
ALBUMIN SERPL-MCNC: 4.7 G/DL (ref 3.5–5.2)
ALBUMIN/GLOB SERPL: 2 G/DL
ALP SERPL-CCNC: 72 U/L (ref 39–117)
ALT SERPL W P-5'-P-CCNC: 20 U/L (ref 1–41)
ANION GAP SERPL CALCULATED.3IONS-SCNC: 9 MMOL/L (ref 5–15)
AST SERPL-CCNC: 18 U/L (ref 1–40)
BASOPHILS # BLD AUTO: 0.04 10*3/MM3 (ref 0–0.2)
BASOPHILS NFR BLD AUTO: 0.7 % (ref 0–1.5)
BILIRUB SERPL-MCNC: 0.2 MG/DL (ref 0–1.2)
BILIRUB UR QL STRIP: NEGATIVE
BUN SERPL-MCNC: 13 MG/DL (ref 6–20)
BUN/CREAT SERPL: 16.7 (ref 7–25)
CALCIUM SPEC-SCNC: 9.2 MG/DL (ref 8.6–10.5)
CHLORIDE SERPL-SCNC: 106 MMOL/L (ref 98–107)
CLARITY UR: CLEAR
CO2 SERPL-SCNC: 24 MMOL/L (ref 22–29)
COLOR UR: YELLOW
CREAT SERPL-MCNC: 0.78 MG/DL (ref 0.76–1.27)
D-LACTATE SERPL-SCNC: 0.8 MMOL/L (ref 0.5–2)
DEPRECATED RDW RBC AUTO: 42.7 FL (ref 37–54)
EGFRCR SERPLBLD CKD-EPI 2021: 105.3 ML/MIN/1.73
EOSINOPHIL # BLD AUTO: 0.16 10*3/MM3 (ref 0–0.4)
EOSINOPHIL NFR BLD AUTO: 2.9 % (ref 0.3–6.2)
ERYTHROCYTE [DISTWIDTH] IN BLOOD BY AUTOMATED COUNT: 13.1 % (ref 12.3–15.4)
GLOBULIN UR ELPH-MCNC: 2.3 GM/DL
GLUCOSE SERPL-MCNC: 96 MG/DL (ref 65–99)
GLUCOSE UR STRIP-MCNC: NEGATIVE MG/DL
HCT VFR BLD AUTO: 45.3 % (ref 37.5–51)
HGB BLD-MCNC: 15.2 G/DL (ref 13–17.7)
HGB UR QL STRIP.AUTO: NEGATIVE
HOLD SPECIMEN: NORMAL
HOLD SPECIMEN: NORMAL
IMM GRANULOCYTES # BLD AUTO: 0.02 10*3/MM3 (ref 0–0.05)
IMM GRANULOCYTES NFR BLD AUTO: 0.4 % (ref 0–0.5)
KETONES UR QL STRIP: NEGATIVE
LEUKOCYTE ESTERASE UR QL STRIP.AUTO: NEGATIVE
LIPASE SERPL-CCNC: 19 U/L (ref 13–60)
LYMPHOCYTES # BLD AUTO: 1.5 10*3/MM3 (ref 0.7–3.1)
LYMPHOCYTES NFR BLD AUTO: 26.9 % (ref 19.6–45.3)
MCH RBC QN AUTO: 29.6 PG (ref 26.6–33)
MCHC RBC AUTO-ENTMCNC: 33.6 G/DL (ref 31.5–35.7)
MCV RBC AUTO: 88.1 FL (ref 79–97)
MONOCYTES # BLD AUTO: 0.42 10*3/MM3 (ref 0.1–0.9)
MONOCYTES NFR BLD AUTO: 7.5 % (ref 5–12)
NEUTROPHILS NFR BLD AUTO: 3.44 10*3/MM3 (ref 1.7–7)
NEUTROPHILS NFR BLD AUTO: 61.6 % (ref 42.7–76)
NITRITE UR QL STRIP: NEGATIVE
NRBC BLD AUTO-RTO: 0 /100 WBC (ref 0–0.2)
PH UR STRIP.AUTO: 6.5 [PH] (ref 5–8)
PLATELET # BLD AUTO: 204 10*3/MM3 (ref 140–450)
PMV BLD AUTO: 9.2 FL (ref 6–12)
POTASSIUM SERPL-SCNC: 4.1 MMOL/L (ref 3.5–5.2)
PROT SERPL-MCNC: 7 G/DL (ref 6–8.5)
PROT UR QL STRIP: NEGATIVE
RBC # BLD AUTO: 5.14 10*6/MM3 (ref 4.14–5.8)
SODIUM SERPL-SCNC: 139 MMOL/L (ref 136–145)
SP GR UR STRIP: 1.02 (ref 1–1.03)
UROBILINOGEN UR QL STRIP: NORMAL
WBC NRBC COR # BLD: 5.58 10*3/MM3 (ref 3.4–10.8)
WHOLE BLOOD HOLD COAG: NORMAL
WHOLE BLOOD HOLD SPECIMEN: NORMAL

## 2023-07-24 PROCEDURE — 80053 COMPREHEN METABOLIC PANEL: CPT

## 2023-07-24 PROCEDURE — 81003 URINALYSIS AUTO W/O SCOPE: CPT

## 2023-07-24 PROCEDURE — 83690 ASSAY OF LIPASE: CPT

## 2023-07-24 PROCEDURE — 25510000001 IOPAMIDOL 61 % SOLUTION: Performed by: EMERGENCY MEDICINE

## 2023-07-24 PROCEDURE — 83605 ASSAY OF LACTIC ACID: CPT

## 2023-07-24 PROCEDURE — 85025 COMPLETE CBC W/AUTO DIFF WBC: CPT

## 2023-07-24 PROCEDURE — 99284 EMERGENCY DEPT VISIT MOD MDM: CPT

## 2023-07-24 PROCEDURE — 74177 CT ABD & PELVIS W/CONTRAST: CPT

## 2023-07-24 PROCEDURE — 99283 EMERGENCY DEPT VISIT LOW MDM: CPT

## 2023-07-24 RX ORDER — SODIUM CHLORIDE 0.9 % (FLUSH) 0.9 %
10 SYRINGE (ML) INJECTION AS NEEDED
Status: DISCONTINUED | OUTPATIENT
Start: 2023-07-24 | End: 2023-07-24 | Stop reason: HOSPADM

## 2023-07-24 RX ORDER — NAPROXEN SODIUM 220 MG
440 TABLET ORAL DAILY PRN
COMMUNITY

## 2023-07-24 RX ADMIN — IOPAMIDOL 100 ML: 612 INJECTION, SOLUTION INTRAVENOUS at 17:19

## 2023-07-24 NOTE — ED PROVIDER NOTES
MD ATTESTATION NOTE    The KJ and I have discussed this patient's history, physical exam, and treatment plan.  I have reviewed the documentation and personally had a face to face interaction with the patient. I affirm the documentation and agree with the treatment and plan.  The attached note describes my personal findings.      I provided a substantive portion of the care of the patient.  I personally performed the physical exam in its entirety, and below are my findings.  For this patient encounter, the patient wore surgical mask, I wore full protective PPE including N95 and eye protection.      Brief HPI: Patient presents for evaluation of intermittent rectal bleeding.  Has been having lower abdominal cramping for 10 days.  Patient has had no vomiting.  Has had no diarrhea.  Is had no fevers or chills.    PHYSICAL EXAM  ED Triage Vitals   Temp Heart Rate Resp BP SpO2   07/24/23 1246 07/24/23 1246 07/24/23 1246 07/24/23 1411 07/24/23 1246   99.2 °F (37.3 °C) 94 16 132/85 97 %      Temp src Heart Rate Source Patient Position BP Location FiO2 (%)   07/24/23 1246 07/24/23 1246 07/24/23 1411 07/24/23 1411 --   Tympanic Monitor Sitting Left arm          GENERAL: no acute distress  HENT: nares patent  EYES: no scleral icterus  CV: regular rhythm, normal rate  RESPIRATORY: normal effort  ABDOMEN: soft.  Mild diffuse abdominal tenderness  MUSCULOSKELETAL: no deformity  NEURO: alert, moves all extremities, follows commands  PSYCH:  calm, cooperative  SKIN: warm, dry    Vital signs and nursing notes reviewed.        Plan: CT scan of the abdomen       Jordin Echeverria MD  07/24/23 5213

## 2023-07-24 NOTE — ED PROVIDER NOTES
EMERGENCY DEPARTMENT ENCOUNTER    Room Number:  04/04  Date of encounter:  7/24/2023  PCP: Radha Pabon APRN  Patient Care Team:  Radha Pabon APRN as PCP - General (Family Medicine)   Independent Historians: Patient    HPI:  Chief Complaint: Rectal bleeding  A complete HPI/ROS/PMH/PSH/SH/FH are unobtainable due to: None    Chronic or social conditions impacting patient care (social determinants of health): None    Context: Fred Bhardwaj is a 53 y.o. male who presents to the ED c/o rectal bleeding that is intermittent for the past 3 weeks as well as some mild lower abdominal cramping.  Patient reports initial episode 3 weeks ago he noticed blood when wiping and it was bright red.  He states since then he has had blood in the toilet with bowel movements that is also bright red in color.  He reports stool is brown.  He denies melena.  No reported history of inflammatory bowel disease.  No fevers or chills.  He has not had any nausea or vomiting.  He had a colonoscopy in 2013 which she reports he believes was normal.  Denies history of constipation or straining with bowel movements.  Does not take blood thinners.    Review of prior external notes (non-ED): Office visit with nurse practitioner Radha Pabon on 8/18/2022.  This was an appointment to establish care.  Reported concerns about type 2 diabetes.  Reports chronic dizziness as well as depression and anxiety.    Review of prior external test results outside of this encounter: CBC and CMP on 8/18/2022 were unremarkable.  Hemoglobin A1c on 8/18/2022 was 5.5.    PAST MEDICAL HISTORY  Active Ambulatory Problems     Diagnosis Date Noted    Other forms of angina pectoris 05/13/2019    Vestibular migraine 11/21/2019    Strain of thoracic region 08/18/2022    Primary hypertension 11/05/2021    Ophthalmic migraine 03/23/2021    Obstructive sleep apnea 08/28/2019    Bilateral myopia 03/23/2021    Asthma 01/10/2013    Acute upper respiratory infection 01/10/2013     Acute bronchitis 01/10/2013    Elevated blood sugar 08/18/2022    Anxiety and depression 08/18/2022     Resolved Ambulatory Problems     Diagnosis Date Noted    No Resolved Ambulatory Problems     Past Medical History:   Diagnosis Date    BPPV (benign paroxysmal positional vertigo), left     Chest pain     Depression     Fatigue     GERD (gastroesophageal reflux disease)     Hypersomnia     Meniere disease     Obesity (BMI 35.0-39.9 without comorbidity)     RBBB     Sleep apnea     Witnessed episode of apnea        The patient has started, but not completed, their COVID-19 vaccination series.    PAST SURGICAL HISTORY  Past Surgical History:   Procedure Laterality Date    COLONOSCOPY  2013    EAR TUBE REMOVAL      WISDOM TOOTH EXTRACTION           FAMILY HISTORY  Family History   Problem Relation Age of Onset    Breast cancer Mother     Heart disease Mother     Depression Father     Dementia Father     Depression Brother         death by suicde 2012    Stroke Maternal Grandmother     Alcohol abuse Maternal Grandfather     Alcohol abuse Paternal Grandmother     Pancreatic cancer Paternal Grandfather          SOCIAL HISTORY  Social History     Socioeconomic History    Marital status:     Number of children: 0   Tobacco Use    Smoking status: Never    Smokeless tobacco: Never    Tobacco comments:     Caffeine 1 pint green tea every morning   Vaping Use    Vaping Use: Never used   Substance and Sexual Activity    Alcohol use: No     Comment: social, rare    Drug use: No    Sexual activity: Yes     Partners: Female         ALLERGIES  Dog fennel, Molds & smuts, and Uncaria tomentosa (cats claw)        REVIEW OF SYSTEMS  Review of Systems   Constitutional:  Negative for chills and fever.   Respiratory:  Negative for shortness of breath.    Cardiovascular:  Negative for chest pain.   Gastrointestinal:  Positive for abdominal pain and anal bleeding. Negative for nausea and vomiting.   Neurological:  Negative for  syncope and light-headedness.      All systems reviewed and negative except for those discussed in HPI.       PHYSICAL EXAM    I have reviewed the triage vital signs and nursing notes.    ED Triage Vitals   Temp Heart Rate Resp BP SpO2   07/24/23 1246 07/24/23 1246 07/24/23 1246 07/24/23 1411 07/24/23 1246   99.2 °F (37.3 °C) 94 16 132/85 97 %      Temp src Heart Rate Source Patient Position BP Location FiO2 (%)   07/24/23 1246 07/24/23 1246 07/24/23 1411 07/24/23 1411 --   Tympanic Monitor Sitting Left arm        Physical Exam  GENERAL: alert, no acute distress  SKIN: Warm, dry  HENT: Normocephalic, atraumatic  EYES: no scleral icterus  CV: regular rhythm, regular rate  RESPIRATORY: normal effort, lungs clear  ABDOMEN: soft, nontender, nondistended  : No external hemorrhoids or fissures  MUSCULOSKELETAL: no deformity  NEURO: alert, moves all extremities, follows commands          LAB RESULTS  Recent Results (from the past 24 hour(s))   Comprehensive Metabolic Panel    Collection Time: 07/24/23  2:28 PM    Specimen: Blood   Result Value Ref Range    Glucose 96 65 - 99 mg/dL    BUN 13 6 - 20 mg/dL    Creatinine 0.78 0.76 - 1.27 mg/dL    Sodium 139 136 - 145 mmol/L    Potassium 4.1 3.5 - 5.2 mmol/L    Chloride 106 98 - 107 mmol/L    CO2 24.0 22.0 - 29.0 mmol/L    Calcium 9.2 8.6 - 10.5 mg/dL    Total Protein 7.0 6.0 - 8.5 g/dL    Albumin 4.7 3.5 - 5.2 g/dL    ALT (SGPT) 20 1 - 41 U/L    AST (SGOT) 18 1 - 40 U/L    Alkaline Phosphatase 72 39 - 117 U/L    Total Bilirubin 0.2 0.0 - 1.2 mg/dL    Globulin 2.3 gm/dL    A/G Ratio 2.0 g/dL    BUN/Creatinine Ratio 16.7 7.0 - 25.0    Anion Gap 9.0 5.0 - 15.0 mmol/L    eGFR 105.3 >60.0 mL/min/1.73   Lipase    Collection Time: 07/24/23  2:28 PM    Specimen: Blood   Result Value Ref Range    Lipase 19 13 - 60 U/L   Lactic Acid, Plasma    Collection Time: 07/24/23  2:28 PM    Specimen: Blood   Result Value Ref Range    Lactate 0.8 0.5 - 2.0 mmol/L   Green Top (Gel)    Collection  Time: 07/24/23  2:28 PM   Result Value Ref Range    Extra Tube Hold for add-ons.    Lavender Top    Collection Time: 07/24/23  2:28 PM   Result Value Ref Range    Extra Tube hold for add-on    Gold Top - SST    Collection Time: 07/24/23  2:28 PM   Result Value Ref Range    Extra Tube Hold for add-ons.    Light Blue Top    Collection Time: 07/24/23  2:28 PM   Result Value Ref Range    Extra Tube Hold for add-ons.    CBC Auto Differential    Collection Time: 07/24/23  2:28 PM    Specimen: Blood   Result Value Ref Range    WBC 5.58 3.40 - 10.80 10*3/mm3    RBC 5.14 4.14 - 5.80 10*6/mm3    Hemoglobin 15.2 13.0 - 17.7 g/dL    Hematocrit 45.3 37.5 - 51.0 %    MCV 88.1 79.0 - 97.0 fL    MCH 29.6 26.6 - 33.0 pg    MCHC 33.6 31.5 - 35.7 g/dL    RDW 13.1 12.3 - 15.4 %    RDW-SD 42.7 37.0 - 54.0 fl    MPV 9.2 6.0 - 12.0 fL    Platelets 204 140 - 450 10*3/mm3    Neutrophil % 61.6 42.7 - 76.0 %    Lymphocyte % 26.9 19.6 - 45.3 %    Monocyte % 7.5 5.0 - 12.0 %    Eosinophil % 2.9 0.3 - 6.2 %    Basophil % 0.7 0.0 - 1.5 %    Immature Grans % 0.4 0.0 - 0.5 %    Neutrophils, Absolute 3.44 1.70 - 7.00 10*3/mm3    Lymphocytes, Absolute 1.50 0.70 - 3.10 10*3/mm3    Monocytes, Absolute 0.42 0.10 - 0.90 10*3/mm3    Eosinophils, Absolute 0.16 0.00 - 0.40 10*3/mm3    Basophils, Absolute 0.04 0.00 - 0.20 10*3/mm3    Immature Grans, Absolute 0.02 0.00 - 0.05 10*3/mm3    nRBC 0.0 0.0 - 0.2 /100 WBC   Urinalysis With Microscopic If Indicated (No Culture) - Urine, Clean Catch    Collection Time: 07/24/23  3:24 PM    Specimen: Urine, Clean Catch   Result Value Ref Range    Color, UA Yellow Yellow, Straw    Appearance, UA Clear Clear    pH, UA 6.5 5.0 - 8.0    Specific Gravity, UA 1.018 1.005 - 1.030    Glucose, UA Negative Negative    Ketones, UA Negative Negative    Bilirubin, UA Negative Negative    Blood, UA Negative Negative    Protein, UA Negative Negative    Leuk Esterase, UA Negative Negative    Nitrite, UA Negative Negative     Urobilinogen, UA 0.2 E.U./dL 0.2 - 1.0 E.U./dL       Ordered the above labs and independently reviewed and interpreted the results.        RADIOLOGY  CT Abdomen Pelvis With Contrast    Result Date: 7/24/2023  CT ABDOMEN AND PELVIS WITH IV CONTRAST  HISTORY: Rectal bleeding  TECHNIQUE: Radiation dose reduction techniques were utilized, including automated exposure control and exposure modulation based on body size. 3 mm images were obtained through the abdomen and pelvis after the administration of IV contrast. Motion artifact.  COMPARISON: None.  FINDINGS: Lower chest: Bibasilar atelectasis and/or scarring right, greater than left. Liver: Moderate hepatic steatosis which limits evaluation for underlying liver lesions..  Biliary tract: Within normal limits.  Spleen: Within normal limits.  Pancreas: Within normal limits.  Adrenals: Within normal limits.  Kidneys:  4.1 cm hypodense mass upper pole right kidney favoring a cyst. Too small to characterize hypodensity in the right lower pole and left upper pole. No hydroureteronephrosis.  Bowel:  No obstruction. Normal appendix. Redundant sigmoid colon. No focal bowel wall thickening.  Peritoneum: No free fluid or free air.  Vasculature:    Within normal limits.  Lymph Nodes:  Scattered subcentimeter nodes.                                                        Pelvic organs: Distended bladder. Prostatic calcifications.  Abdominal/Pelvic Wall: Within normal limits.  Bones: Multilevel degenerative changes most severe at L5-S1. MRI could be considered for further evaluation as clinically indicated.      1.  No acute intra-abdominal or pelvic process. If rectal bleeding persists consider further GI evaluation. 2.  Moderate diffuse hepatic steatosis. 3.  Hypodense renal lesions the largest favor cysts the others are too small to characterize. 4.  Please see above for additional findings/recommendations.    This report was finalized on 7/24/2023 5:47 PM by Dr. Cliff Jacobo M.D.        I ordered the above noted radiological studies. Independently reviewed and interpreted by me.  See dictation for official radiology interpretation.      PROCEDURES    Procedures      MEDICATIONS GIVEN IN ER    Medications   iopamidol (ISOVUE-300) 61 % injection 100 mL (100 mL Intravenous Given 7/24/23 1719)         PROGRESS, DATA ANALYSIS, CONSULTS, AND MEDICAL DECISION MAKING    All labs have been independently reviewed and interpreted by me.  All radiology studies have been independently reviewed and interpreted by me and discussed with radiologist dictating the report.   EKG's independently reviewed and interpreted by me.  Discussion below represents my analysis of pertinent findings related to patient's condition, differential diagnosis, treatment plan and final disposition.    Differential diagnosis: hemorrhoids, fissure, colitis    ED Course as of 07/25/23 0035   Mon Jul 24, 2023   1755 CT Abdomen Pelvis With Contrast  Radiology study independently interpreted by me and my findings are no free air.   [DC]   1803 WBC: 5.58 [DC]   1804 Hemoglobin: 15.2 [DC]   1804 Platelets: 204 [DC]   1804 Glucose: 96 [DC]   1804 BUN: 13 [DC]   1804 Creatinine: 0.78 [DC]   1804 Lipase: 19 [DC]   1804 Lactate: 0.8 [DC]   1804 External  exam performed with RN chaperone. No external hemorrhoids or fissure. No tenderness. [DC]      ED Course User Index  [DC] Joy Gipson PA           PPE: Patient was placed in face mask in first look. Patient was wearing facemask when I entered the room and throughout our encounter. I wore full protective equipment throughout this patient encounter including a face mask, and gloves. Hand hygiene was performed before donning protective equipment and after removal when leaving the room.        AS OF 00:35 EDT VITALS:    BP - 141/92  HR - 63  TEMP - 99.2 °F (37.3 °C) (Tympanic)  O2 SATS - 93%        DIAGNOSIS  Final diagnoses:   Bright red blood per rectum   Lower abdominal pain          DISPOSITION  ED Disposition       ED Disposition   Discharge    Condition   Stable    Comment   --                  Note Disclaimer: At [facility], we believe that sharing information builds trust and better relationships. You are receiving this note because you recently visited [facility]. It is possible you will see health information before a provider has talked with you about it. This kind of information can be easy to misunderstand. To help you fully understand what it means for your health, we urge you to discuss this note with your provider.         Joy Gipson PA  07/25/23 0035

## 2023-07-24 NOTE — ED NOTES
Pt says he's been experiencing intermittent bloody stools x's 3 weeks and some blood while urinating. Says his stool has been brown and solid. Pt also complains of lower abd. pain x's 10 days. Pt says he's been under a lot of stress lately as well and has some intermittent weakness and says some dizziness that usually occurs with his chronic migraines. Denies any other s/s at this time.

## 2023-08-01 ENCOUNTER — OFFICE VISIT (OUTPATIENT)
Dept: FAMILY MEDICINE CLINIC | Facility: CLINIC | Age: 53
End: 2023-08-01
Payer: COMMERCIAL

## 2023-08-01 VITALS
DIASTOLIC BLOOD PRESSURE: 78 MMHG | SYSTOLIC BLOOD PRESSURE: 132 MMHG | RESPIRATION RATE: 18 BRPM | OXYGEN SATURATION: 98 % | BODY MASS INDEX: 30.48 KG/M2 | WEIGHT: 225 LBS | HEART RATE: 63 BPM | HEIGHT: 72 IN

## 2023-08-01 DIAGNOSIS — K62.5 RECTAL BLEEDING: Primary | ICD-10-CM

## 2023-08-01 DIAGNOSIS — G43.709 CHRONIC MIGRAINE WITHOUT AURA WITHOUT STATUS MIGRAINOSUS, NOT INTRACTABLE: ICD-10-CM

## 2023-08-01 PROCEDURE — 99214 OFFICE O/P EST MOD 30 MIN: CPT | Performed by: NURSE PRACTITIONER

## 2023-08-01 RX ORDER — NAPROXEN 250 MG/1
250 TABLET ORAL
COMMUNITY

## 2023-08-01 RX ORDER — RIZATRIPTAN BENZOATE 10 MG/1
10 TABLET ORAL ONCE AS NEEDED
Qty: 10 TABLET | Refills: 0 | Status: SHIPPED | OUTPATIENT
Start: 2023-08-01

## 2024-01-16 ENCOUNTER — TELEPHONE (OUTPATIENT)
Dept: GASTROENTEROLOGY | Facility: CLINIC | Age: 54
End: 2024-01-16
Payer: COMMERCIAL

## 2024-01-16 ENCOUNTER — OFFICE VISIT (OUTPATIENT)
Dept: GASTROENTEROLOGY | Facility: CLINIC | Age: 54
End: 2024-01-16
Payer: COMMERCIAL

## 2024-01-16 VITALS
SYSTOLIC BLOOD PRESSURE: 134 MMHG | HEART RATE: 64 BPM | TEMPERATURE: 97.1 F | HEIGHT: 72 IN | BODY MASS INDEX: 31.59 KG/M2 | WEIGHT: 233.2 LBS | DIASTOLIC BLOOD PRESSURE: 79 MMHG

## 2024-01-16 DIAGNOSIS — K62.5 RECTAL BLEEDING: Primary | ICD-10-CM

## 2024-01-16 PROCEDURE — 99203 OFFICE O/P NEW LOW 30 MIN: CPT | Performed by: INTERNAL MEDICINE

## 2024-01-16 NOTE — TELEPHONE ENCOUNTER
GAYLE HINOJOSA IN SCHEDULING PT SCHEDULED 04/01/2024 ARRIVAL 2:05PM.M[facility]AL[employer] PREP PACK HANDED TO THE [facility] FOR HUB TO READ

## 2024-01-16 NOTE — PROGRESS NOTES
"Chief Complaint   Patient presents with   • Rectal Bleeding     Fred Bhardwaj is a 53 y.o. male who presents with a history of acute rectal bleeding  HPI    Patient 53-year-old male with history of hypertension, GERD and diabetes who presented to the hospital with acute rectal bleeding.  Patient reports bleeding last several days though patient found with normal hemoglobin labs otherwise normal reports is done well several days without further bleeding here for further recommendations.  Patient last colonoscopy in 2013.    Past Medical History:   Diagnosis Date   • Asthma    • BPPV (benign paroxysmal positional vertigo), left    • Chest pain    • Depression    • Diabetes mellitus I have prediabetic symptoms, and my AC1 level italo from 5.5% to 5.6% over the past year.   • Fatigue    • GERD (gastroesophageal reflux disease)    • Hypersomnia    • Hypertension I was diagnosed with hypertension a year ago.  Currently taking one 25 mg hydorcholorothiazide tablet a day for it.   • Meniere disease    • Obesity (BMI 35.0-39.9 without comorbidity)    • RBBB    • Sleep apnea    • Vestibular migraine    • Witnessed episode of apnea        Current Outpatient Medications:   •  albuterol sulfate  (90 Base) MCG/ACT inhaler, Inhale 2 puffs Every 4 (Four) Hours As Needed for Wheezing., Disp: 1 inhaler, Rfl: 0  •  hydroCHLOROthiazide (HYDRODIURIL) 25 MG tablet, Take 1 tablet by mouth Daily., Disp: , Rfl:   •  multivitamin with minerals tablet tablet, Take 1 tablet by mouth Daily., Disp: , Rfl:   •  naproxen (NAPROSYN) 250 MG tablet, Take 1 tablet by mouth., Disp: , Rfl:   •  naproxen sodium (ALEVE) 220 MG tablet, Take 2 tablets by mouth Daily As Needed for Headache. \"For migraines.\", Disp: , Rfl:   •  rizatriptan (Maxalt) 10 MG tablet, Take 1 tablet by mouth 1 (One) Time As Needed for Migraine for up to 10 doses. May repeat in 2 hours if needed (Patient not taking: Reported on 1/16/2024), Disp: 10 tablet, Rfl: 0  Allergies "   Allergen Reactions   • Dog Fennel Shortness Of Breath   • Molds & Smuts Shortness Of Breath   • Uncaria [place]entosa (Cats Claw) Shortness Of Breath     Social History     Socioeconomic History   • Marital status:    • Number of children: 0   Tobacco Use   • Smoking status: Never   • Smokeless tobacco: Never   • Tobacco comments:     Caffeine 1 pint green tea every morning   Vaping Use   • Vaping Use: Never used   Substance and Sexual Activity   • Alcohol use: No   • Drug use: No   • Sexual activity: Yes     Partners: Female     Birth control/protection: Condom, Natural family planning/Rhythm, Rhythm     Family History   Problem Relation Age of Onset   • Breast cancer Mother    • Heart disease Mother    • Depression Father    • Dementia Father    • Depression Brother         death by suicde 2012   • Stroke Maternal Grandmother    • Alcohol abuse Maternal Grandfather    • Alcohol abuse Paternal Grandmother    • Pancreatic cancer Paternal Grandfather      Review of Systems   Constitutional: Negative.    HENT: Negative.     Eyes: Negative.    Respiratory: Negative.     Cardiovascular: Negative.    Gastrointestinal: Negative.    Endocrine: Negative.    Musculoskeletal: Negative.    Skin: Negative.    Allergic/Immunologic: Negative.    Hematological: Negative.    Vitals:    01/16/24 1115   BP: 134/79   Pulse: 64   Temp: 97.1 °F (36.2 °C)     Physical Exam  Vitals and nursing note reviewed.   Constitutional:       Appearance: Normal appearance. He is well-developed and normal weight.   HENT:      Head: Normocephalic and atraumatic.   Eyes:      General: No scleral icterus.     Conjunctiva/sclera: Conjunctivae normal.   Neck:      Trachea: No tracheal deviation.   Cardiovascular:      Rate and Rhythm: Normal rate and regular rhythm.      Heart sounds: Normal heart sounds.   Pulmonary:      Effort: Pulmonary effort is normal. No respiratory distress.      Breath sounds: Normal breath sounds.   Abdominal:      General:  Bowel sounds are normal. There is no distension.      Palpations: Abdomen is soft. There is no mass.      Tenderness: There is no abdominal tenderness. There is no guarding or rebound.   Musculoskeletal:         General: No swelling or tenderness. Normal range of motion.      Cervical back: Normal range of motion and neck supple. No rigidity.   Skin:     General: Skin is warm and dry.      Coloration: Skin is not jaundiced.   Neurological:      General: No focal deficit present.      Mental Status: He is alert and oriented to person, place, and time.      Cranial Nerves: No cranial nerve deficit.   Psychiatric:         Behavior: Behavior normal.         Thought Content: Thought content normal.         Judgment: Judgment normal.   Diagnoses and all orders for this visit:    Rectal bleeding  -     Case Request; Standing  -     Implement Anesthesia orders day of procedure.; Standing  -     Obtain Informed Consent; Standing  -     Case Request    Patient 53-year-old male with history of Ménière's disease, hypertension, diabetes presented to the hospital with acute rectal bleeding.  Patient reports bleeding was painless last several days but since his evaluation with normal hemoglobin his bleeding is stopped.  Patient denies abdominal pain no change in bowel habits, patient reports regular bowel movements.  Patient here for follow-up.  Patient last colonoscopy 2013 review of bleeding would recommend colonoscopy to evaluate for any other possible sources of acute blood loss.  Will follow-up clinically for further recommendations.

## 2024-01-17 ENCOUNTER — TELEPHONE (OUTPATIENT)
Dept: GASTROENTEROLOGY | Facility: CLINIC | Age: 54
End: 2024-01-17
Payer: COMMERCIAL

## 2024-04-01 ENCOUNTER — ANESTHESIA (OUTPATIENT)
Dept: GASTROENTEROLOGY | Facility: HOSPITAL | Age: 54
End: 2024-04-01
Payer: COMMERCIAL

## 2024-04-01 ENCOUNTER — HOSPITAL ENCOUNTER (OUTPATIENT)
Facility: HOSPITAL | Age: 54
Setting detail: HOSPITAL OUTPATIENT SURGERY
Discharge: HOME | End: 2024-04-01
Attending: INTERNAL MEDICINE | Admitting: INTERNAL MEDICINE
Payer: COMMERCIAL

## 2024-04-01 ENCOUNTER — ANESTHESIA EVENT (OUTPATIENT)
Dept: GASTROENTEROLOGY | Facility: HOSPITAL | Age: 54
End: 2024-04-01
Payer: COMMERCIAL

## 2024-04-01 VITALS
WEIGHT: 225.3 LBS | SYSTOLIC BLOOD PRESSURE: 137 MMHG | DIASTOLIC BLOOD PRESSURE: 93 MMHG | HEART RATE: 59 BPM | OXYGEN SATURATION: 98 % | HEIGHT: 72 IN | RESPIRATION RATE: 20 BRPM | BODY MASS INDEX: 30.51 KG/M2

## 2024-04-01 DIAGNOSIS — K62.5 RECTAL BLEEDING: ICD-10-CM

## 2024-04-01 PROCEDURE — 25010000002 PROPOFOL 10 MG/ML EMULSION: Performed by: NURSE ANESTHETIST, CERTIFIED REGISTERED

## 2024-04-01 PROCEDURE — 45378 DIAGNOSTIC COLONOSCOPY: CPT | Performed by: INTERNAL MEDICINE

## 2024-04-01 PROCEDURE — 25810000003 LACTATED RINGERS PER 1000 ML: Performed by: INTERNAL MEDICINE

## 2024-04-01 PROCEDURE — S0260 H&P FOR SURGERY: HCPCS | Performed by: INTERNAL MEDICINE

## 2024-04-01 RX ORDER — LIDOCAINE HYDROCHLORIDE 20 MG/ML
INJECTION, SOLUTION INFILTRATION; PERINEURAL AS NEEDED
Status: DISCONTINUED | OUTPATIENT
Start: 2024-04-01 | End: 2024-04-01 | Stop reason: SURG

## 2024-04-01 RX ORDER — SODIUM CHLORIDE, SODIUM LACTATE, POTASSIUM CHLORIDE, CALCIUM CHLORIDE 600; 310; 30; 20 MG/100ML; MG/100ML; MG/100ML; MG/100ML
30 INJECTION, SOLUTION INTRAVENOUS CONTINUOUS PRN
Status: DISCONTINUED | OUTPATIENT
Start: 2024-04-01 | End: 2024-04-01 | Stop reason: HOSPADM

## 2024-04-01 RX ORDER — PROPOFOL 10 MG/ML
VIAL (ML) INTRAVENOUS CONTINUOUS PRN
Status: DISCONTINUED | OUTPATIENT
Start: 2024-04-01 | End: 2024-04-01 | Stop reason: SURG

## 2024-04-01 RX ORDER — SODIUM CHLORIDE 0.9 % (FLUSH) 0.9 %
10 SYRINGE (ML) INJECTION EVERY 12 HOURS SCHEDULED
Status: DISCONTINUED | OUTPATIENT
Start: 2024-04-01 | End: 2024-04-01 | Stop reason: HOSPADM

## 2024-04-01 RX ORDER — SODIUM CHLORIDE 9 MG/ML
40 INJECTION, SOLUTION INTRAVENOUS AS NEEDED
Status: DISCONTINUED | OUTPATIENT
Start: 2024-04-01 | End: 2024-04-01 | Stop reason: HOSPADM

## 2024-04-01 RX ORDER — SODIUM CHLORIDE 0.9 % (FLUSH) 0.9 %
10 SYRINGE (ML) INJECTION AS NEEDED
Status: DISCONTINUED | OUTPATIENT
Start: 2024-04-01 | End: 2024-04-01 | Stop reason: HOSPADM

## 2024-04-01 RX ADMIN — PROPOFOL 100 MG: 10 INJECTION, EMULSION INTRAVENOUS at 15:47

## 2024-04-01 RX ADMIN — SODIUM CHLORIDE, POTASSIUM CHLORIDE, SODIUM LACTATE AND CALCIUM CHLORIDE 30 ML/HR: 600; 310; 30; 20 INJECTION, SOLUTION INTRAVENOUS at 14:25

## 2024-04-01 RX ADMIN — PROPOFOL 180 MCG/KG/MIN: 10 INJECTION, EMULSION INTRAVENOUS at 15:48

## 2024-04-01 RX ADMIN — LIDOCAINE HYDROCHLORIDE 60 MG: 20 INJECTION, SOLUTION INFILTRATION; PERINEURAL at 15:48

## 2024-04-01 NOTE — ANESTHESIA POSTPROCEDURE EVALUATION
Patient: Fred Bhardwaj    Procedure Summary       Date: 04/01/24 Room / Location:  [facility]    Anesthesia Start: 1544 Anesthesia Stop: 1610    Procedure: COLONOSCOPY to cecum and into TI Diagnosis:       Rectal bleeding      Internal hemorrhoids      (Rectal bleeding [K62.5])    Surgeons: Rudy Melissa MD Provider: Chase Roldan MD    Anesthesia Type: MAC ASA Status: 3            Anesthesia Type: MAC    Vitals  Vitals Value Taken Time   /93 04/01/24 1629   Temp     Pulse 62 04/01/24 1634   Resp 20 04/01/24 1628   SpO2 97 % 04/01/24 1634   Vitals shown include unfiled device data.        Post Anesthesia Care and Evaluation    Patient location during evaluation: bedside  Patient participation: complete - patient participated  Level of consciousness: awake  Pain management: adequate    Airway patency: patent  Anesthetic complications: No anesthetic complications    Cardiovascular status: acceptable  Respiratory status: acceptable  Hydration status: acceptable

## 2024-04-01 NOTE — DISCHARGE INSTRUCTIONS
For the next 24 hours patient needs to be with a responsible adult.    For 24 hours DO NOT drive, operate machinery, appliances, drink alcohol, make important decisions or sign legal documents.    Start with a light or bland diet if you are feeling sick to your stomach otherwise advance to regular diet as tolerated.    Follow recommendations on procedure report if provided by your doctor.    Call Dr Melissa for problems .    Problems may include but not limited to: large amounts of bleeding, trouble breathing, repeated vomiting, severe unrelieved pain, fever or chills.

## 2024-04-01 NOTE — BRIEF OP NOTE
COLONOSCOPY  Progress Note    Fred Bhardwaj  4/1/2024    Pre-op Diagnosis:   Rectal bleeding [K62.5]       Post-Op Diagnosis Codes:     * Rectal bleeding [K62.5]     * Internal hemorrhoids [K64.8]    Procedure/CPT® Codes:        Procedure(s):  COLONOSCOPY to cecum and into TI              Surgeon(s):  Rudy Melissa MD    Anesthesia: Monitored Anesthesia Care    Staff:   Endo Technician: Nancy Cortez  Endo Nurse: Vandana Gayle RN         Estimated Blood Loss: minimal    Urine Voided: * No values recorded between 4/1/2024  3:42 PM and 4/1/2024  4:07 PM *    Specimens:                none          Drains: * No LDAs found *    Findings: Colonoscopy to the terminal ileum with normal ileal mucosa.  Internal hemorrhoids were noted but the remainder the colonic mucosa is normal.        Complications: None          Rudy Melissa MD     Date: 4/1/2024  Time: 16:07 EDT      mucosa

## 2024-04-01 NOTE — ANESTHESIA PREPROCEDURE EVALUATION
Anesthesia Evaluation     Patient summary reviewed and Nursing notes reviewed                Airway   Mallampati: II  TM distance: >3 FB  Neck ROM: full  Dental      Pulmonary    (+) asthma,sleep apnea  Cardiovascular     ECG reviewed  Rhythm: regular  Rate: normal    (+) hypertension      Neuro/Psych  (+) headaches, psychiatric history Anxiety and Depression  GI/Hepatic/Renal/Endo    (+) obesity, GERD, GI bleeding     Musculoskeletal (-) negative ROS    Abdominal    Substance History - negative use     OB/GYN negative ob/gyn ROS         Other                      Anesthesia Plan    ASA 3     MAC     (RBBB  EZ)  intravenous induction     Anesthetic plan, risks, benefits, and alternatives have been provided, discussed and informed consent has been obtained with: patient.    CODE STATUS:

## 2024-04-01 NOTE — H&P
[facility], [facility]  Pre Procedure History & Physical    Chief Complaint:   Colonoscopy screening    Subjective     HPI:   Patient 53-year-old male with history of hypertension, reflux and asthma initially presented with episode of rectal bleeding.    Patient's rectal bleeding was a single episode no longer occurring but last colonoscopy 2023 here for colonoscopy screening    Past Medical History:   Past Medical History:   Diagnosis Date    Asthma     BPPV (benign paroxysmal positional vertigo), right     USES CANE D/T VERTIGO:  UNABLE TO WORK, DOES NOT DRIVE D/T VERTIGO    Chest pain     Depression     Fatigue     GERD (gastroesophageal reflux disease)     OTC    Hypersomnia     Hypertension     Meniere disease     Obesity (BMI 35.0-39.9 without comorbidity)     Pre-diabetes     RBBB     Sleep apnea     Vestibular migraine        Past Surgical History:  Past Surgical History:   Procedure Laterality Date    COLONOSCOPY  2013    EAR TUBE REMOVAL      AS A CHILD    WISDOM TOOTH EXTRACTION         Family History:  Family History   Problem Relation Age of Onset    Breast cancer Mother     Heart disease Mother     Depression Father     Dementia Father     Depression Brother         death by suicde 2012    Stroke Maternal Grandmother     Alcohol abuse Maternal Grandfather     Alcohol abuse Paternal Grandmother     Pancreatic cancer Paternal Grandfather     Malig Hyperthermia Neg Hx        Social History:   reports that he has never smoked. He has never used smokeless tobacco. He reports that he does not drink alcohol and does not use drugs.    Medications:   Medications Prior to Admission   Medication Sig Dispense Refill Last Dose    albuterol sulfate  (90 Base) MCG/ACT inhaler Inhale 2 puffs Every 4 (Four) Hours As Needed for Wheezing. 1 inhaler 0 Past Month    multivitamin with minerals tablet tablet Take 1 tablet by mouth Daily.   3/29/2024    naproxen sodium (ALEVE) 220 MG tablet Take 2 tablets  "by mouth Daily As Needed for Headache. \"For migraines.\"   Past Week    hydroCHLOROthiazide (HYDRODIURIL) 25 MG tablet Take 1 tablet by mouth Daily.   More than a month       Allergies:  Dog fennel, Molds & smuts, and Uncaria tomentosa (cats claw)    ROS:    Pertinent items are noted in HPI     Objective     Blood pressure 139/88, pulse 65, resp. rate 13, height 182.9 cm (72\"), weight 102 kg (225 lb 4.8 oz), SpO2 94%.    Physical Exam   Constitutional: Pt is oriented to person, place, and time and well-developed, well-nourished, and in no distress.   Mouth/Throat: Oropharynx is clear and moist.   Neck: Normal range of motion.   Cardiovascular: Normal rate, regular rhythm and normal heart sounds.    Pulmonary/Chest: Effort normal and breath sounds normal.   Abdominal: Soft. Nontender  Skin: Skin is warm and dry.   Psychiatric: Mood, memory, affect and judgment normal.     Assessment & Plan     Diagnosis:  History of rectal bleeding x 1  Colonoscopy screening    Anticipated Surgical Procedure:  Colonoscopy    The risks, benefits, and alternatives of this procedure have been discussed with the patient or the responsible party- the patient understands and agrees to proceed.                                                          "

## 2024-12-16 ENCOUNTER — OFFICE VISIT (OUTPATIENT)
Dept: FAMILY MEDICINE CLINIC | Facility: CLINIC | Age: 54
End: 2024-12-16
Payer: COMMERCIAL

## 2024-12-16 VITALS
HEIGHT: 72 IN | HEART RATE: 64 BPM | DIASTOLIC BLOOD PRESSURE: 100 MMHG | SYSTOLIC BLOOD PRESSURE: 152 MMHG | OXYGEN SATURATION: 97 % | RESPIRATION RATE: 18 BRPM | WEIGHT: 237 LBS | BODY MASS INDEX: 32.1 KG/M2

## 2024-12-16 DIAGNOSIS — G47.33 OBSTRUCTIVE SLEEP APNEA: ICD-10-CM

## 2024-12-16 DIAGNOSIS — K21.9 GASTROESOPHAGEAL REFLUX DISEASE WITHOUT ESOPHAGITIS: ICD-10-CM

## 2024-12-16 DIAGNOSIS — E66.811 CLASS 1 OBESITY DUE TO EXCESS CALORIES WITH SERIOUS COMORBIDITY AND BODY MASS INDEX (BMI) OF 32.0 TO 32.9 IN ADULT: ICD-10-CM

## 2024-12-16 DIAGNOSIS — I10 PRIMARY HYPERTENSION: ICD-10-CM

## 2024-12-16 DIAGNOSIS — E66.09 CLASS 1 OBESITY DUE TO EXCESS CALORIES WITH SERIOUS COMORBIDITY AND BODY MASS INDEX (BMI) OF 32.0 TO 32.9 IN ADULT: ICD-10-CM

## 2024-12-16 DIAGNOSIS — Z00.00 ANNUAL PHYSICAL EXAM: Primary | ICD-10-CM

## 2024-12-16 DIAGNOSIS — R73.9 ELEVATED BLOOD SUGAR: ICD-10-CM

## 2024-12-16 DIAGNOSIS — Z23 NEED FOR VACCINATION: ICD-10-CM

## 2024-12-16 DIAGNOSIS — R20.2 NUMBNESS AND TINGLING OF BOTH LOWER EXTREMITIES: ICD-10-CM

## 2024-12-16 DIAGNOSIS — R20.0 NUMBNESS AND TINGLING OF BOTH LOWER EXTREMITIES: ICD-10-CM

## 2024-12-16 DIAGNOSIS — Z12.5 SCREENING FOR MALIGNANT NEOPLASM OF PROSTATE: ICD-10-CM

## 2024-12-16 PROCEDURE — 90750 HZV VACC RECOMBINANT IM: CPT | Performed by: NURSE PRACTITIONER

## 2024-12-16 PROCEDURE — 99214 OFFICE O/P EST MOD 30 MIN: CPT | Performed by: NURSE PRACTITIONER

## 2024-12-16 PROCEDURE — 90471 IMMUNIZATION ADMIN: CPT | Performed by: NURSE PRACTITIONER

## 2024-12-16 PROCEDURE — 90472 IMMUNIZATION ADMIN EACH ADD: CPT | Performed by: NURSE PRACTITIONER

## 2024-12-16 PROCEDURE — 99396 PREV VISIT EST AGE 40-64: CPT | Performed by: NURSE PRACTITIONER

## 2024-12-16 PROCEDURE — 90677 PCV20 VACCINE IM: CPT | Performed by: NURSE PRACTITIONER

## 2024-12-16 RX ORDER — HYDROCHLOROTHIAZIDE 12.5 MG/1
12.5 TABLET ORAL DAILY
Qty: 30 TABLET | Refills: 3 | Status: SHIPPED | OUTPATIENT
Start: 2024-12-16

## 2024-12-16 RX ORDER — ALBUTEROL SULFATE 90 UG/1
2 INHALANT RESPIRATORY (INHALATION) EVERY 4 HOURS PRN
Qty: 18 G | Refills: 3 | Status: SHIPPED | OUTPATIENT
Start: 2024-12-16

## 2024-12-16 RX ORDER — LOSARTAN POTASSIUM 50 MG/1
50 TABLET ORAL DAILY
Qty: 30 TABLET | Refills: 3 | Status: SHIPPED | OUTPATIENT
Start: 2024-12-16

## 2024-12-16 NOTE — PROGRESS NOTES
Subjective   Fred Bhardwaj is a 54 y.o. male.   Patient here for annual physical exam, labs, chronic illness management and updated screening.      History of Present Illness     The following portions of the patient's history were reviewed and updated as appropriate: allergies, current medications, past family history, past medical history, past social history, past surgical history and problem list.       The patient is a 54-year-old male who presents for an annual exam. He has acute concerns with a history of elevated A1c and numbness and tingling in his arms, feels as though his blood sugar is high. Over the past year you think? 2 years, okay. He is due for blood work today.    Patient has concerns about developing diabetes. He has been experiencing elevated blood glucose levels for the past 2 years. He has not previously been treated with metformin. He experiences intermittent numbness and tingling in his extremities, which he attributes to excessive sugar intake and lack of exercise. He gets very anxious and feels like blood sugar is high all the time. No alcohol, no soda, does not eat a sweet heavy diet. He denies polydipsia, polyphagia or polyuria.  He has noticed that consuming raw kale alleviates these symptoms.    Chronic hypertension x years. He reports experiencing elevated blood pressure upon awakening, accompanied by a sensation of his heart racing. He has been without his prescribed diuretic for approximately 6 months due to relocation. His only antihypertensive medication has been a diuretic. He does not consume alcohol or caffeine and abstains from smoking and recreational drug use. He attempts to limit his salt intake. He possesses a home blood pressure monitor but needs to locate it following his move.he is anxious. /100 today. He has historically had chest pain on occasion, no recent chest pain, palps, headaches or vision changes. No edema.     He has a history of asthma, diagnosed in  2005, and occasionally uses albuterol when air quality is poor. He does not have albuterol at home. He also experiences seasonal allergies and frequent bronchitis but has not required hospitalization. He is allergic to DOGS, MOLD, CATS, ANTS. He has no known drug allergies.Needs inhaler refill. Does not use allergy meds.     He has a diagnosis of sleep apnea and consistently uses a CPAP machine. He received COVID-19 and influenza vaccines in October 2023 and the first dose of the shingles vaccine in summer 2021. He underwent a colonoscopy in April 2024, with a recommendation for a repeat procedure in 10 years. He occasionally takes naproxen sodium and a multivitamin formulated for individuals over 50. He reports no leg or ankle swelling. He maintains an active lifestyle, including aerobic exercises at home, and uses 2 canes for mobility. He is not vegan but leans towards a vegetarian diet, consuming chicken and fish. He does not consume soda, sweet tea, juice, or junk food. He tries to avoid salt. He is 6 feet tall.    He has a history of gastroesophageal reflux disease (GERD) and has experienced chest pain, which he believes may be related to his GERD.  Take OTC meds as needed.    SOCIAL HISTORY  He does not drink alcohol. He does not consume caffeine. He does not smoke. He does not do recreational drugs. He is an artist.    FAMILY HISTORY  His parents are both still living and fine.    ALLERGIES  He is allergic to DOGS, MOLD, CATS, ANTS. He has no known drug allergies.    MEDICATIONS  Current: albuterol, CPAP, naproxen sodium, multivitamin    IMMUNIZATIONS  He received his COVID-19 and flu vaccines in October 2023. He had his first shingles shot in summer 2021.       Review of Systems   Constitutional:  Negative for activity change and unexpected weight loss.   HENT:  Negative for congestion.         Dental exam is due      Eyes:  Negative for visual disturbance.        Eye exam is due    Respiratory:  Negative  "for cough and shortness of breath.    Cardiovascular:  Negative for chest pain, palpitations and leg swelling.   Gastrointestinal:  Negative for abdominal distention, constipation, diarrhea and GERD.   Endocrine: Negative for cold intolerance, heat intolerance, polydipsia, polyphagia and polyuria.   Genitourinary:  Negative for urinary incontinence.   Neurological:  Negative for weakness.   Hematological:  Does not bruise/bleed easily.   Psychiatric/Behavioral:  Positive for stress. Negative for sleep disturbance and depressed mood. The patient is nervous/anxious.          Current Outpatient Medications:     albuterol sulfate  (90 Base) MCG/ACT inhaler, Inhale 2 puffs Every 4 (Four) Hours As Needed for Wheezing., Disp: 18 g, Rfl: 3    hydroCHLOROthiazide 12.5 MG tablet, Take 1 tablet by mouth Daily., Disp: 30 tablet, Rfl: 3    multivitamin with minerals tablet tablet, Take 1 tablet by mouth Daily., Disp: , Rfl:     naproxen sodium (ALEVE) 220 MG tablet, Take 2 tablets by mouth Daily As Needed for Headache. \"For migraines.\", Disp: , Rfl:     losartan (COZAAR) 50 MG tablet, Take 1 tablet by mouth Daily., Disp: 30 tablet, Rfl: 3    Objective   Physical Exam  Vitals reviewed.   Constitutional:       Appearance: Normal appearance. He is obese.   HENT:      Head: Normocephalic.      Right Ear: Tympanic membrane normal.      Left Ear: Tympanic membrane normal.      Nose: Nose normal.      Mouth/Throat:      Mouth: Mucous membranes are moist.   Eyes:      Pupils: Pupils are equal, round, and reactive to light.   Cardiovascular:      Rate and Rhythm: Normal rate and regular rhythm.      Pulses: Normal pulses.      Heart sounds: Normal heart sounds.   Pulmonary:      Effort: Pulmonary effort is normal.      Breath sounds: Normal breath sounds.   Abdominal:      General: Bowel sounds are normal.      Palpations: Abdomen is soft.   Musculoskeletal:         General: Normal range of motion.      Cervical back: Normal range " of motion.      Right lower leg: Edema (trace) present.      Left lower leg: Edema (trace) present.   Skin:     General: Skin is warm and dry.   Neurological:      General: No focal deficit present.      Mental Status: He is alert.   Psychiatric:         Mood and Affect: Mood is anxious.         Vitals:    12/16/24 0747   BP: 152/100   Pulse: 64   Resp: 18   SpO2: 97%     Body mass index is 32.14 kg/m².    Procedures    TSH   Date Value Ref Range Status   08/18/2022 1.840 0.450 - 4.500 uIU/mL Final     Hemoglobin A1C   Date Value Ref Range Status   08/18/2022 5.5 4.8 - 5.6 % Final     Comment:              Prediabetes: 5.7 - 6.4           Diabetes: >6.4           Glycemic control for adults with diabetes: <7.0              Over the past 2 weeks, how often have you been bothered by any of the following problems?  Little interest or pleasure in doing things: Not at all  Feeling down, depressed, or hopeless: Not at all      Assessment & Plan   Problems Addressed this Visit       Primary hypertension    Relevant Medications    losartan (COZAAR) 50 MG tablet    hydroCHLOROthiazide 12.5 MG tablet    Other Relevant Orders    CBC & Differential    Comprehensive Metabolic Panel    Lipid Panel With / Chol / HDL Ratio    TSH    T4, Free    Obstructive sleep apnea    Elevated blood sugar    Relevant Orders    Hemoglobin A1c    Class 1 obesity due to excess calories with serious comorbidity and body mass index (BMI) of 32.0 to 32.9 in adult     Patient's (Body mass index is 32.14 kg/m².) indicates that they are obese (BMI >30) with health conditions that include obstructive sleep apnea and hypertension . Weight is newly identified. BMI  is above average; BMI management plan is completed. We discussed portion control, increasing exercise, and joining a fitness center or start home based exercise program.           Other Visit Diagnoses       Annual physical exam    -  Primary    Relevant Orders    CBC & Differential     Comprehensive Metabolic Panel    Lipid Panel With / Chol / HDL Ratio    Need for vaccination        Relevant Orders    Shingrix Vaccine (Completed)    Pneumococcal Conjugate Vaccine 20-Valent (PCV20) (Completed)    Screening for malignant neoplasm of prostate        Relevant Orders    PSA Screen    Numbness and tingling of both lower extremities        Relevant Orders    Hemoglobin A1c    Vitamin B12    Folate    Gastroesophageal reflux disease without esophagitis              Diagnoses         Codes Comments    Annual physical exam    -  Primary ICD-10-CM: Z00.00  ICD-9-CM: V70.0     Need for vaccination     ICD-10-CM: Z23  ICD-9-CM: V05.9     Obstructive sleep apnea     ICD-10-CM: G47.33  ICD-9-CM: 327.23     Primary hypertension     ICD-10-CM: I10  ICD-9-CM: 401.9     Elevated blood sugar     ICD-10-CM: R73.9  ICD-9-CM: 790.29     Screening for malignant neoplasm of prostate     ICD-10-CM: Z12.5  ICD-9-CM: V76.44     Class 1 obesity due to excess calories with serious comorbidity and body mass index (BMI) of 32.0 to 32.9 in adult     ICD-10-CM: E66.811, E66.09, Z68.32  ICD-9-CM: 278.00, V85.32     Numbness and tingling of both lower extremities     ICD-10-CM: R20.0, R20.2  ICD-9-CM: 782.0     Gastroesophageal reflux disease without esophagitis     ICD-10-CM: K21.9  ICD-9-CM: 530.81           Orders Placed This Encounter   Procedures    Shingrix Vaccine    Pneumococcal Conjugate Vaccine 20-Valent (PCV20)    Comprehensive Metabolic Panel     Order Specific Question:   Release to patient     Answer:   Routine Release [[id number]]    Lipid Panel With / Chol / HDL Ratio     Order Specific Question:   Release to patient     Answer:   Routine Release [[id number]]    Hemoglobin A1c     Order Specific Question:   Release to patient     Answer:   Routine Release [[id number]]    TSH     Order Specific Question:   Release to patient     Answer:   Routine Release [[id number]]    T4, Free     Order Specific Question:   Release  to patient     Answer:   Routine Release [[id number]]    PSA Screen     Order Specific Question:   Release to patient     Answer:   Routine Release [[id number]]    Vitamin B12     Order Specific Question:   Release to patient     Answer:   Routine Release [[id number]]    Folate     Order Specific Question:   Release to patient     Answer:   Routine Release [[id number]]    CBC & Differential     Order Specific Question:   Release to patient     Answer:   Routine Release [[id number]]       Assessment & Plan  1. Hypertension.  His blood pressure readings were elevated during this visit. He will be initiated on losartan 50 mg daily, along with HCTZ 12.5 mg. He is advised to monitor his blood pressure at home and report the readings via [employer]. If he experiences any adverse reactions such as rash or swelling, he should discontinue the medication and take Benadryl.    2. Prediabetes.  He has a history of elevated A1c and reports numbness and tingling in his arms, which may be related to elevated blood sugar levels. He is advised to increase dietary fiber intake, reduce sugar consumption, and engage in physical activity post meals. He is also advised to maintain a plant-based diet and ensure adequate hydration. Labs will be checked to confirm the diagnosis. If his blood sugar is significantly elevated, injectable medications like Ozempic will be considered.    3. Asthma.  He has a history of asthma and uses albuterol occasionally. A prescription for an inhaler has been sent to [facility].    4. Sleep Apnea.  He uses a CPAP machine consistently. No changes to his current management are necessary.    5. Gastroesophageal Reflux Disease (GERD).  He reports chest pain associated with GERD. He is advised to take Tums or Pepcid as needed, monitor his diet, and remain upright after meals. If he experiences chest pain accompanied by shortness of breath or sweating, he should seek immediate medical attention at the  ER.    6. Health Maintenance.  He will receive the pneumonia and shingles vaccines today. Blood work will be conducted to assess thyroid function, kidney function, liver function, anemia, cholesterol levels, A1c, and prostate health. He is advised to use Tylenol or acetaminophen as needed.    Follow-up  The patient will follow up in 3 months.    PROCEDURE  Colonoscopy was performed in April 2024.      Preventative care- Follow heart healthy diet, drink water, walk daily. Wear seatbelts, wear helmets, wear sunscreens. Follow CDC guidelines for covid and flu .          Education provided in AVS   Return in about 3 months (around 3/16/2025) for Recheck.    Patient or patient representative verbalized consent for the use of Ambient Listening during the visit with  YESSICA Ortiz for chart documentation. 12/16/2024  10:00 EST

## 2024-12-16 NOTE — ASSESSMENT & PLAN NOTE
Patient's (Body mass index is 32.14 kg/m².) indicates that they are obese (BMI >30) with health conditions that include obstructive sleep apnea and hypertension . Weight is newly identified. BMI  is above average; BMI management plan is completed. We discussed portion control, increasing exercise, and joining a fitness center or start home based exercise program.

## 2024-12-17 LAB
ALBUMIN SERPL-MCNC: 4.3 G/DL (ref 3.5–5.2)
ALBUMIN/GLOB SERPL: 1.5 G/DL
ALP SERPL-CCNC: 80 U/L (ref 39–117)
ALT SERPL-CCNC: 31 U/L (ref 1–41)
AST SERPL-CCNC: 29 U/L (ref 1–40)
BASOPHILS # BLD AUTO: 0.03 10*3/MM3 (ref 0–0.2)
BASOPHILS NFR BLD AUTO: 0.6 % (ref 0–1.5)
BILIRUB SERPL-MCNC: 0.4 MG/DL (ref 0–1.2)
BUN SERPL-MCNC: 10 MG/DL (ref 6–20)
BUN/CREAT SERPL: 11.8 (ref 7–25)
CALCIUM SERPL-MCNC: 9.1 MG/DL (ref 8.6–10.5)
CHLORIDE SERPL-SCNC: 104 MMOL/L (ref 98–107)
CHOLEST SERPL-MCNC: 162 MG/DL (ref 0–200)
CHOLEST/HDLC SERPL: 3.6 {RATIO}
CO2 SERPL-SCNC: 24.6 MMOL/L (ref 22–29)
CREAT SERPL-MCNC: 0.85 MG/DL (ref 0.76–1.27)
EGFRCR SERPLBLD CKD-EPI 2021: 102 ML/MIN/1.73
EOSINOPHIL # BLD AUTO: 0.27 10*3/MM3 (ref 0–0.4)
EOSINOPHIL NFR BLD AUTO: 5.3 % (ref 0.3–6.2)
ERYTHROCYTE [DISTWIDTH] IN BLOOD BY AUTOMATED COUNT: 13.2 % (ref 12.3–15.4)
FOLATE SERPL-MCNC: 10.9 NG/ML (ref 4.78–24.2)
GLOBULIN SER CALC-MCNC: 2.8 GM/DL
GLUCOSE SERPL-MCNC: 89 MG/DL (ref 65–99)
HBA1C MFR BLD: 5.2 % (ref 4.8–5.6)
HCT VFR BLD AUTO: 47.2 % (ref 37.5–51)
HDLC SERPL-MCNC: 45 MG/DL (ref 40–60)
HGB BLD-MCNC: 15.8 G/DL (ref 13–17.7)
IMM GRANULOCYTES # BLD AUTO: 0.03 10*3/MM3 (ref 0–0.05)
IMM GRANULOCYTES NFR BLD AUTO: 0.6 % (ref 0–0.5)
LDLC SERPL CALC-MCNC: 95 MG/DL (ref 0–100)
LYMPHOCYTES # BLD AUTO: 1.82 10*3/MM3 (ref 0.7–3.1)
LYMPHOCYTES NFR BLD AUTO: 36 % (ref 19.6–45.3)
MCH RBC QN AUTO: 29.5 PG (ref 26.6–33)
MCHC RBC AUTO-ENTMCNC: 33.5 G/DL (ref 31.5–35.7)
MCV RBC AUTO: 88.2 FL (ref 79–97)
MONOCYTES # BLD AUTO: 0.48 10*3/MM3 (ref 0.1–0.9)
MONOCYTES NFR BLD AUTO: 9.5 % (ref 5–12)
NEUTROPHILS # BLD AUTO: 2.43 10*3/MM3 (ref 1.7–7)
NEUTROPHILS NFR BLD AUTO: 48 % (ref 42.7–76)
NRBC BLD AUTO-RTO: 0 /100 WBC (ref 0–0.2)
PLATELET # BLD AUTO: 230 10*3/MM3 (ref 140–450)
POTASSIUM SERPL-SCNC: 3.9 MMOL/L (ref 3.5–5.2)
PROT SERPL-MCNC: 7.1 G/DL (ref 6–8.5)
PSA SERPL-MCNC: 2.79 NG/ML (ref 0–4)
RBC # BLD AUTO: 5.35 10*6/MM3 (ref 4.14–5.8)
SODIUM SERPL-SCNC: 139 MMOL/L (ref 136–145)
T4 FREE SERPL-MCNC: 1.21 NG/DL (ref 0.92–1.68)
TRIGL SERPL-MCNC: 124 MG/DL (ref 0–150)
TSH SERPL DL<=0.005 MIU/L-ACNC: 2.76 UIU/ML (ref 0.27–4.2)
VIT B12 SERPL-MCNC: 355 PG/ML (ref 211–946)
VLDLC SERPL CALC-MCNC: 22 MG/DL (ref 5–40)
WBC # BLD AUTO: 5.06 10*3/MM3 (ref 3.4–10.8)

## 2025-02-05 ENCOUNTER — TELEPHONE (OUTPATIENT)
Dept: FAMILY MEDICINE CLINIC | Facility: CLINIC | Age: 55
End: 2025-02-05
Payer: COMMERCIAL

## 2025-02-05 NOTE — TELEPHONE ENCOUNTER
Spoke with patient. Has been dealing with a lot of stress/GERD. Spoke with Dr. Amezcua about this and said to bring the patient in for a visit with PCP and to get an at home BP monitor to take blood pressures at home.    Pt agreed, appt 2/6/25 @ 9:45

## 2025-02-05 NOTE — TELEPHONE ENCOUNTER
Patient is complaining of chest pains,insomnia. States that he thinks it is acid reflux. Sent call to MA.

## 2025-02-07 DIAGNOSIS — G47.33 OBSTRUCTIVE SLEEP APNEA: Primary | ICD-10-CM

## 2025-03-17 RX ORDER — LOSARTAN POTASSIUM 50 MG/1
50 TABLET ORAL DAILY
Qty: 90 TABLET | Refills: 1 | Status: SHIPPED | OUTPATIENT
Start: 2025-03-17

## 2025-04-21 ENCOUNTER — APPOINTMENT (OUTPATIENT)
Dept: GENERAL RADIOLOGY | Facility: HOSPITAL | Age: 55
End: 2025-04-21
Payer: COMMERCIAL

## 2025-04-21 ENCOUNTER — HOSPITAL ENCOUNTER (OUTPATIENT)
Facility: HOSPITAL | Age: 55
Setting detail: OBSERVATION
Discharge: HOME | End: 2025-04-22
Attending: STUDENT IN AN ORGANIZED HEALTH CARE EDUCATION/TRAINING PROGRAM | Admitting: EMERGENCY MEDICINE
Payer: COMMERCIAL

## 2025-04-21 DIAGNOSIS — R07.9 NONSPECIFIC CHEST PAIN: Primary | ICD-10-CM

## 2025-04-21 LAB
ALBUMIN SERPL-MCNC: 4.3 G/DL (ref 3.5–5.2)
ALBUMIN/GLOB SERPL: 1.6 G/DL
ALP SERPL-CCNC: 74 U/L (ref 39–117)
ALT SERPL W P-5'-P-CCNC: 29 U/L (ref 1–41)
ANION GAP SERPL CALCULATED.3IONS-SCNC: 8 MMOL/L (ref 5–15)
AST SERPL-CCNC: 25 U/L (ref 1–40)
BASOPHILS # BLD AUTO: 0.02 10*3/MM3 (ref 0–0.2)
BASOPHILS NFR BLD AUTO: 0.4 % (ref 0–1.5)
BILIRUB SERPL-MCNC: 0.3 MG/DL (ref 0–1.2)
BUN SERPL-MCNC: 12 MG/DL (ref 6–20)
BUN/CREAT SERPL: 14.5 (ref 7–25)
CALCIUM SPEC-SCNC: 9.1 MG/DL (ref 8.6–10.5)
CHLORIDE SERPL-SCNC: 105 MMOL/L (ref 98–107)
CO2 SERPL-SCNC: 25 MMOL/L (ref 22–29)
CREAT SERPL-MCNC: 0.83 MG/DL (ref 0.76–1.27)
D DIMER PPP FEU-MCNC: <0.27 MCGFEU/ML (ref 0–0.56)
DEPRECATED RDW RBC AUTO: 42.6 FL (ref 37–54)
EGFRCR SERPLBLD CKD-EPI 2021: 102.7 ML/MIN/1.73
EOSINOPHIL # BLD AUTO: 0.11 10*3/MM3 (ref 0–0.4)
EOSINOPHIL NFR BLD AUTO: 2.2 % (ref 0.3–6.2)
ERYTHROCYTE [DISTWIDTH] IN BLOOD BY AUTOMATED COUNT: 13.2 % (ref 12.3–15.4)
GEN 5 1HR TROPONIN T REFLEX: 8 NG/L
GLOBULIN UR ELPH-MCNC: 2.7 GM/DL
GLUCOSE SERPL-MCNC: 119 MG/DL (ref 65–99)
HCT VFR BLD AUTO: 44.8 % (ref 37.5–51)
HGB BLD-MCNC: 14.9 G/DL (ref 13–17.7)
HOLD SPECIMEN: NORMAL
HOLD SPECIMEN: NORMAL
IMM GRANULOCYTES # BLD AUTO: 0.02 10*3/MM3 (ref 0–0.05)
IMM GRANULOCYTES NFR BLD AUTO: 0.4 % (ref 0–0.5)
LYMPHOCYTES # BLD AUTO: 1.59 10*3/MM3 (ref 0.7–3.1)
LYMPHOCYTES NFR BLD AUTO: 32.4 % (ref 19.6–45.3)
MCH RBC QN AUTO: 29.7 PG (ref 26.6–33)
MCHC RBC AUTO-ENTMCNC: 33.3 G/DL (ref 31.5–35.7)
MCV RBC AUTO: 89.2 FL (ref 79–97)
MONOCYTES # BLD AUTO: 0.35 10*3/MM3 (ref 0.1–0.9)
MONOCYTES NFR BLD AUTO: 7.1 % (ref 5–12)
NEUTROPHILS NFR BLD AUTO: 2.82 10*3/MM3 (ref 1.7–7)
NEUTROPHILS NFR BLD AUTO: 57.5 % (ref 42.7–76)
NRBC BLD AUTO-RTO: 0 /100 WBC (ref 0–0.2)
PLATELET # BLD AUTO: 194 10*3/MM3 (ref 140–450)
PMV BLD AUTO: 9.5 FL (ref 6–12)
POTASSIUM SERPL-SCNC: 3.9 MMOL/L (ref 3.5–5.2)
PROT SERPL-MCNC: 7 G/DL (ref 6–8.5)
QT INTERVAL: 364 MS
QTC INTERVAL: 412 MS
RBC # BLD AUTO: 5.02 10*6/MM3 (ref 4.14–5.8)
SODIUM SERPL-SCNC: 138 MMOL/L (ref 136–145)
TROPONIN T NUMERIC DELTA: -2 NG/L
TROPONIN T SERPL HS-MCNC: 10 NG/L
WBC NRBC COR # BLD AUTO: 4.91 10*3/MM3 (ref 3.4–10.8)
WHOLE BLOOD HOLD COAG: NORMAL
WHOLE BLOOD HOLD SPECIMEN: NORMAL

## 2025-04-21 PROCEDURE — G0378 HOSPITAL OBSERVATION PER HR: HCPCS

## 2025-04-21 PROCEDURE — 84484 ASSAY OF TROPONIN QUANT: CPT

## 2025-04-21 PROCEDURE — 36415 COLL VENOUS BLD VENIPUNCTURE: CPT

## 2025-04-21 PROCEDURE — 85025 COMPLETE CBC W/AUTO DIFF WBC: CPT

## 2025-04-21 PROCEDURE — 93005 ELECTROCARDIOGRAM TRACING: CPT

## 2025-04-21 PROCEDURE — 84484 ASSAY OF TROPONIN QUANT: CPT | Performed by: STUDENT IN AN ORGANIZED HEALTH CARE EDUCATION/TRAINING PROGRAM

## 2025-04-21 PROCEDURE — 85379 FIBRIN DEGRADATION QUANT: CPT | Performed by: NURSE PRACTITIONER

## 2025-04-21 PROCEDURE — 93005 ELECTROCARDIOGRAM TRACING: CPT | Performed by: STUDENT IN AN ORGANIZED HEALTH CARE EDUCATION/TRAINING PROGRAM

## 2025-04-21 PROCEDURE — 99285 EMERGENCY DEPT VISIT HI MDM: CPT

## 2025-04-21 PROCEDURE — 71045 X-RAY EXAM CHEST 1 VIEW: CPT

## 2025-04-21 PROCEDURE — 80053 COMPREHEN METABOLIC PANEL: CPT

## 2025-04-21 PROCEDURE — 93010 ELECTROCARDIOGRAM REPORT: CPT | Performed by: INTERNAL MEDICINE

## 2025-04-21 RX ORDER — NITROGLYCERIN 0.4 MG/1
0.4 TABLET SUBLINGUAL
Status: DISCONTINUED | OUTPATIENT
Start: 2025-04-21 | End: 2025-04-22 | Stop reason: HOSPADM

## 2025-04-21 RX ORDER — SODIUM CHLORIDE 0.9 % (FLUSH) 0.9 %
10 SYRINGE (ML) INJECTION AS NEEDED
Status: DISCONTINUED | OUTPATIENT
Start: 2025-04-21 | End: 2025-04-22 | Stop reason: HOSPADM

## 2025-04-21 RX ORDER — MULTIPLE VITAMINS W/ MINERALS TAB 9MG-400MCG
1 TAB ORAL DAILY
Status: DISCONTINUED | OUTPATIENT
Start: 2025-04-22 | End: 2025-04-22 | Stop reason: HOSPADM

## 2025-04-21 RX ORDER — OMEPRAZOLE/SODIUM BICARBONATE 20MG-1.1G
CAPSULE ORAL
COMMUNITY

## 2025-04-21 RX ORDER — ASPIRIN 325 MG
325 TABLET ORAL ONCE
Status: DISCONTINUED | OUTPATIENT
Start: 2025-04-21 | End: 2025-04-21

## 2025-04-21 RX ORDER — LOSARTAN POTASSIUM 50 MG/1
50 TABLET ORAL DAILY
Status: DISCONTINUED | OUTPATIENT
Start: 2025-04-22 | End: 2025-04-22 | Stop reason: HOSPADM

## 2025-04-21 RX ORDER — ASPIRIN 81 MG/1
81 TABLET ORAL DAILY
Status: DISCONTINUED | OUTPATIENT
Start: 2025-04-22 | End: 2025-04-22 | Stop reason: HOSPADM

## 2025-04-21 RX ORDER — HYDROCHLOROTHIAZIDE 12.5 MG/1
12.5 TABLET ORAL DAILY
Status: DISCONTINUED | OUTPATIENT
Start: 2025-04-22 | End: 2025-04-22 | Stop reason: HOSPADM

## 2025-04-21 RX ORDER — SODIUM CHLORIDE 0.9 % (FLUSH) 0.9 %
10 SYRINGE (ML) INJECTION EVERY 12 HOURS SCHEDULED
Status: DISCONTINUED | OUTPATIENT
Start: 2025-04-21 | End: 2025-04-22 | Stop reason: HOSPADM

## 2025-04-21 RX ORDER — SODIUM CHLORIDE 9 MG/ML
40 INJECTION, SOLUTION INTRAVENOUS AS NEEDED
Status: DISCONTINUED | OUTPATIENT
Start: 2025-04-21 | End: 2025-04-22 | Stop reason: HOSPADM

## 2025-04-21 RX ORDER — POLYETHYLENE GLYCOL 3350 17 G/17G
17 POWDER, FOR SOLUTION ORAL DAILY PRN
Status: DISCONTINUED | OUTPATIENT
Start: 2025-04-21 | End: 2025-04-22 | Stop reason: HOSPADM

## 2025-04-21 RX ORDER — ASPIRIN 81 MG/1
324 TABLET, CHEWABLE ORAL ONCE
Status: COMPLETED | OUTPATIENT
Start: 2025-04-21 | End: 2025-04-21

## 2025-04-21 RX ORDER — ALBUTEROL SULFATE 90 UG/1
2 INHALANT RESPIRATORY (INHALATION) EVERY 4 HOURS PRN
Status: DISCONTINUED | OUTPATIENT
Start: 2025-04-21 | End: 2025-04-22 | Stop reason: HOSPADM

## 2025-04-21 RX ORDER — AMOXICILLIN 250 MG
2 CAPSULE ORAL 2 TIMES DAILY
Status: DISCONTINUED | OUTPATIENT
Start: 2025-04-21 | End: 2025-04-22 | Stop reason: HOSPADM

## 2025-04-21 RX ORDER — BISACODYL 5 MG/1
5 TABLET, DELAYED RELEASE ORAL DAILY PRN
Status: DISCONTINUED | OUTPATIENT
Start: 2025-04-21 | End: 2025-04-22 | Stop reason: HOSPADM

## 2025-04-21 RX ORDER — BISACODYL 10 MG
10 SUPPOSITORY, RECTAL RECTAL DAILY PRN
Status: DISCONTINUED | OUTPATIENT
Start: 2025-04-21 | End: 2025-04-22 | Stop reason: HOSPADM

## 2025-04-21 RX ADMIN — ASPIRIN 324 MG: 81 TABLET, CHEWABLE ORAL at 15:58

## 2025-04-21 RX ADMIN — Medication 10 ML: at 21:32

## 2025-04-21 NOTE — ED PROVIDER NOTES
EMERGENCY DEPARTMENT ENCOUNTER  Room Number:  34/34  PCP: Radha Pabon APRN  Independent Historians: Patient      HPI:  Chief Complaint: had concerns including Chest Pain.     A complete HPI/ROS/PMH/PSH/SH/FH are unobtainable due to: None    Chronic or social conditions impacting patient care (Social Determinants of Health): None      Context: Fred Bhardwaj is a 54 y.o. male with a medical history of hypertension, BMI greater than 30, prediabetes, who presents to the ED c/o acute chest pain, radiating into his left neck and jaw.  He states he has had this intermittently for 3 months ever since his father passed away.  He states that usually comes on with emotional distress.  It is not exertional.  He has not had any other associated symptoms.    Review of prior external notes (non-ED) -and- Review of prior external test results outside of this encounter:  I reviewed cardiology progress note 5/13/2019.  presents with shortness of breath and chest discomfort.  Intermittent dizziness.  Sleep apnea.  Diagnosed with right bundle branch block 10 years ago.  Set up for perfusion study.  As well as echocardiogram.      5/20/2019.  Normal perfusion study.  LVEF 63%, low risk      Prescription drug monitoring program review:         PAST MEDICAL HISTORY  Active Ambulatory Problems     Diagnosis Date Noted    Other forms of angina pectoris 05/13/2019    Vestibular migraine 11/21/2019    Strain of thoracic region 08/18/2022    Primary hypertension 11/05/2021    Ophthalmic migraine 03/23/2021    Obstructive sleep apnea 08/28/2019    Bilateral myopia 03/23/2021    Asthma 01/10/2013    Acute upper respiratory infection 01/10/2013    Acute bronchitis 01/10/2013    Elevated blood sugar 08/18/2022    Anxiety and depression 08/18/2022    Rectal bleeding 01/16/2024    Class 1 obesity due to excess calories with serious comorbidity and body mass index (BMI) of 32.0 to 32.9 in adult 12/16/2024     Resolved Ambulatory Problems      Diagnosis Date Noted    No Resolved Ambulatory Problems     Past Medical History:   Diagnosis Date    BPPV (benign paroxysmal positional vertigo), right     Chest pain     Depression     Fatigue     GERD (gastroesophageal reflux disease)     Hypersomnia     Hypertension     Meniere disease     Obesity (BMI 35.0-39.9 without comorbidity)     Pre-diabetes     RBBB     Sleep apnea          PAST SURGICAL HISTORY  Past Surgical History:   Procedure Laterality Date    COLONOSCOPY  2013    COLONOSCOPY N/A 4/1/2024    Procedure: COLONOSCOPY to cecum and into TI;  Surgeon: Rudy Melissa MD;  Location: [facility] ENDOSCOPY;  Service: Gastroenterology;  Laterality: N/A;  Pre: rectal bleeding  Post: hemorhoids    EAR TUBE REMOVAL      AS A CHILD    WISDOM TOOTH EXTRACTION           FAMILY HISTORY  Family History   Problem Relation Age of Onset    Breast cancer Mother     Heart disease Mother     Depression Father     Dementia Father     Depression Brother         death by suicde 2012    Stroke Maternal Grandmother     Alcohol abuse Maternal Grandfather     Alcohol abuse Paternal Grandmother     Pancreatic cancer Paternal Grandfather     Malig Hyperthermia Neg Hx          SOCIAL HISTORY  Social History     Socioeconomic History    Marital status:     Number of children: 0   Tobacco Use    Smoking status: Never    Smokeless tobacco: Never    Tobacco comments:     Caffeine 1 pint green tea every morning   Vaping Use    Vaping status: Never Used   Substance and Sexual Activity    Alcohol use: No    Drug use: No    Sexual activity: Yes     Partners: Female     Birth control/protection: Condom, Natural family planning/Rhythm, Rhythm       ALLERGIES  Dog fennel, Molds & smuts, and Uncaria tomentosa (cats claw)      PHYSICAL EXAM    I have reviewed the triage vital signs and nursing notes.    ED Triage Vitals   Temp Heart Rate Resp BP SpO2   03/02/25 1448 03/02/25 1448 03/02/25 1448 03/02/25 1450 03/02/25 1448   97.4 °F (36.3  °C) 95 16 141/84 97 %      Temp src Heart Rate Source Patient Position BP Location FiO2 (%)   -- -- -- -- --              Physical Exam  GENERAL: alert, no acute distress  SKIN: Warm, dry  HENT: Normocephalic, atraumatic  EYES: no scleral icterus  CV: regular rhythm, regular rate  RESPIRATORY: normal effort, lungs clear  ABDOMEN: soft, nondistended, nontender  MUSCULOSKELETAL: no deformity  NEURO: alert, moves all extremities, follows commands        LAB RESULTS  Recent Results (from the past 24 hours)   ECG 12 Lead ED Triage Standing Order; Chest Pain    Collection Time: 04/21/25  1:10 PM   Result Value Ref Range    QT Interval 364 ms    QTC Interval 412 ms   Comprehensive Metabolic Panel    Collection Time: 04/21/25  1:15 PM    Specimen: Arm, Right; Blood   Result Value Ref Range    Glucose 119 (H) 65 - 99 mg/dL    BUN 12 6 - 20 mg/dL    Creatinine 0.83 0.76 - 1.27 mg/dL    Sodium 138 136 - 145 mmol/L    Potassium 3.9 3.5 - 5.2 mmol/L    Chloride 105 98 - 107 mmol/L    CO2 25.0 22.0 - 29.0 mmol/L    Calcium 9.1 8.6 - 10.5 mg/dL    Total Protein 7.0 6.0 - 8.5 g/dL    Albumin 4.3 3.5 - 5.2 g/dL    ALT (SGPT) 29 1 - 41 U/L    AST (SGOT) 25 1 - 40 U/L    Alkaline Phosphatase 74 39 - 117 U/L    Total Bilirubin 0.3 0.0 - 1.2 mg/dL    Globulin 2.7 gm/dL    A/G Ratio 1.6 g/dL    BUN/Creatinine Ratio 14.5 7.0 - 25.0    Anion Gap 8.0 5.0 - 15.0 mmol/L    eGFR 102.7 >60.0 mL/min/1.73   High Sensitivity Troponin T    Collection Time: 04/21/25  1:15 PM    Specimen: Arm, Right; Blood   Result Value Ref Range    HS Troponin T 10 <22 ng/L   Green Top (Gel)    Collection Time: 04/21/25  1:15 PM   Result Value Ref Range    Extra Tube Hold for add-ons.    Lavender Top    Collection Time: 04/21/25  1:15 PM   Result Value Ref Range    Extra Tube hold for add-on    Gold Top - SST    Collection Time: 04/21/25  1:15 PM   Result Value Ref Range    Extra Tube Hold for add-ons.    Light Blue Top    Collection Time: 04/21/25  1:15 PM    Result Value Ref Range    Extra Tube Hold for add-ons.    CBC Auto Differential    Collection Time: 04/21/25  1:15 PM    Specimen: Arm, Right; Blood   Result Value Ref Range    WBC 4.91 3.40 - 10.80 10*3/mm3    RBC 5.02 4.14 - 5.80 10*6/mm3    Hemoglobin 14.9 13.0 - 17.7 g/dL    Hematocrit 44.8 37.5 - 51.0 %    MCV 89.2 79.0 - 97.0 fL    MCH 29.7 26.6 - 33.0 pg    MCHC 33.3 31.5 - 35.7 g/dL    RDW 13.2 12.3 - 15.4 %    RDW-SD 42.6 37.0 - 54.0 fl    MPV 9.5 6.0 - 12.0 fL    Platelets 194 140 - 450 10*3/mm3    Neutrophil % 57.5 42.7 - 76.0 %    Lymphocyte % 32.4 19.6 - 45.3 %    Monocyte % 7.1 5.0 - 12.0 %    Eosinophil % 2.2 0.3 - 6.2 %    Basophil % 0.4 0.0 - 1.5 %    Immature Grans % 0.4 0.0 - 0.5 %    Neutrophils, Absolute 2.82 1.70 - 7.00 10*3/mm3    Lymphocytes, Absolute 1.59 0.70 - 3.10 10*3/mm3    Monocytes, Absolute 0.35 0.10 - 0.90 10*3/mm3    Eosinophils, Absolute 0.11 0.00 - 0.40 10*3/mm3    Basophils, Absolute 0.02 0.00 - 0.20 10*3/mm3    Immature Grans, Absolute 0.02 0.00 - 0.05 10*3/mm3    nRBC 0.0 0.0 - 0.2 /100 WBC   High Sensitivity Troponin T 1Hr    Collection Time: 04/21/25  2:39 PM    Specimen: Arm, Right; Blood   Result Value Ref Range    HS Troponin T 8 <22 ng/L    Troponin T Numeric Delta -2 Abnormal if >/=3 ng/L       RADIOLOGY  XR Chest 1 View  Result Date: 4/21/2025  XR CHEST 1 VW-4/21/2025  HISTORY: Chest pain.  Heart size is within normal limits. Lungs appear free of acute infiltrates. 2 images are submitted. There are minor degenerative changes in the spine.      1. No acute process.   This report was finalized on 4/21/2025 1:32 PM by Dr. Chong Bailon M.D on Workstation: [id number]          MEDICATIONS GIVEN IN ER  Medications   sodium chloride 0.9 % flush 10 mL (has no administration in time range)   aspirin tablet 325 mg (0 mg Oral Hold 4/21/25 1443)         ORDERS PLACED DURING THIS VISIT:  Orders Placed This Encounter   Procedures    XR Chest 1 View    [place] Draw     "Comprehensive Metabolic Panel    High Sensitivity Troponin T    CBC Auto Differential    High Sensitivity Troponin T 1Hr    NPO Diet NPO Type: Strict NPO    Undress & Gown    Continuous Pulse Oximetry    Oxygen Therapy- Nasal Cannula; Titrate 1-6 LPM Per SpO2; 90 - 95%    ECG 12 Lead ED Triage Standing Order; Chest Pain    ECG 12 Lead ED Triage Standing Order; Chest Pain    Insert Peripheral IV    Initiate Emergency Department Observation Status    CBC & Differential    Green Top (Gel)    Lavender Top    Gold Top - SST    Light Blue Top         OUTPATIENT MEDICATION MANAGEMENT:  Current Facility-Administered Medications Ordered in Epic   Medication Dose Route Frequency Provider Last Rate Last Admin    aspirin tablet 325 mg  325 mg Oral Once Rodolfo Alvarado MD        sodium chloride 0.9 % flush 10 mL  10 mL Intravenous PRN Rodolfo Alvarado MD         Current Outpatient Medications Ordered in Epic   Medication Sig Dispense Refill    albuterol sulfate  (90 Base) MCG/ACT inhaler Inhale 2 puffs Every 4 (Four) Hours As Needed for Wheezing. 18 g 3    hydroCHLOROthiazide 12.5 MG tablet Take 1 tablet by mouth Daily. 30 tablet 3    losartan (COZAAR) 50 MG tablet TAKE 1 TABLET BY MOUTH EVERY DAY 90 tablet 1    multivitamin with minerals tablet tablet Take 1 tablet by mouth Daily.      naproxen sodium (ALEVE) 220 MG tablet Take 2 tablets by mouth Daily As Needed for Headache. \"For migraines.\"           PROCEDURES  Procedures            PROGRESS, DATA ANALYSIS, CONSULTS, AND MEDICAL DECISION MAKING  All labs have been independently interpreted by me.  All radiology studies have been reviewed by me. All EKG's have been independently viewed and interpreted by me.  Discussion below represents my analysis of pertinent findings related to patient's condition, differential diagnosis, treatment plan and final disposition.    Differential diagnosis includes but is not limited to ACS, STEMI, NSTEMI, atypical chest pain, GERD, " dysrhythmia, electrolyte or metabolic derangement.    Clinical Scores:         HEART Score: 2                               ED Course as of 04/21/25 1549   Mon Apr 21, 2025   1426 Patient presents ED for evaluation of chest pain, radiating into his left neck and jaw.  He states he has had this intermittently for 3 months ever since his father passed away.  He states that usually comes on with emotional distress.  It is not exertional.  He has not had any other associated symptoms.    Physical exam is benign, no pretibial pitting edema    Will obtain cardiac evaluation, patient is agreeable [DN]   1427 XR Chest 1 View  I reviewed patient's xray image(s), no focal infiltrate, interpreted by self  I reviewed the radiologist's interpretation of above image(s)   [DN]   1427 ECG 12 Lead ED Triage Standing Order; Chest Pain  Sinus rhythm, rate 77, leftward axis, normal intervals with incomplete RBBB, no significant T or ST changes, no STEMI    I compared EKG to prior on 8/22/2016.  No significant change.  EKGs interpreted by self [DN]   1427 HS Troponin T: 10 [DN]   1427 Hemoglobin: 14.9 [DN]   1427 Creatinine: 0.83 [DN]   1500 I discussed results with patient.  Reassuring evaluation thus far.  Discussed considering admission for observation and cardiac evaluation versus close outpatient follow-up.  He will consider while we await second troponin [DN]   1538 Troponin T Numeric Delta: -2 [DN]   1547 I discussed results with patient, he is agreeable with plan to admit for observation for cardiac eval [DN]   1549 I discussed patient with on-call KJ for observation unit, agreeable plan to admit [DN]      ED Course User Index  [DN] Rodolfo Alvarado MD             AS OF 15:49 EDT VITALS:    BP - 133/77  HR - 65  TEMP - 99.3 °F (37.4 °C)  O2 SATS - 97%    COMPLEXITY OF CARE  The patient requires admission.      DIAGNOSIS  Final diagnoses:   Nonspecific chest pain         DISPOSITION  ED Disposition       ED Disposition    Decision to Admit    Condition   --    Comment   --               New Medications Ordered This Visit   Medications    sodium chloride 0.9 % flush 10 mL    aspirin tablet 325 mg       Please note that portions of this document were completed with a voice recognition program.    Note Disclaimer: At [facility], we believe that sharing information builds trust and better relationships. You are receiving this note because you recently visited [facility]. It is possible you will see health information before a provider has talked with you about it. This kind of information can be easy to misunderstand. To help you fully understand what it means for your health, we urge you to discuss this note with your provider.         Rodolfo Alvarado MD  04/21/25 [id number]       Rodolfo Alvarado MD  04/21/25 [id number]

## 2025-04-21 NOTE — PLAN OF CARE
Goal Outcome Evaluation:              Outcome Evaluation: pt is a 54 year old male admitted to the obs unit for chest pain, pt is aox4, vss, pt has not complained of chest pain or SOB since arriving to the unit, trop 10-8, cardiology is consulted, pt to be npo and go for an echo, pt has no further questions at this time, pt is resting at this time

## 2025-04-21 NOTE — H&P
[facility]   HISTORY AND PHYSICAL    Patient Name: Fred Bhardwaj  : 1970  MRN: [id number]  Primary Care Physician:  Radha Pabon APRN  Date of admission: 2025    Subjective   Subjective     Chief Complaint:   Chief Complaint   Patient presents with    Chest Pain         HPI:    Fred Bhardwaj is a pleasant afebrile ambulatory 54 y.o.  male with a past medical history of hypertension, migraine, sleep apnea and prediabetes.    He presents to the emergency department [facility] today with complaint of chest discomfort.  He has been admitted to the ED observation unit for further testing and evaluation.    Patient describes left-sided chest discomfort that will intermittently radiate to his left jaw.  States it is associated with palpitations.  He denies any associated nausea or diaphoresis.  States his chest discomfort started after his father passed away about 3 months ago.    She is not a smoker.  He endorses that relaxing seems to make the discomfort alleviate.  He denies any exertional component to discomfort.  He is not a smoker.  Denies any immediate family history of coronary artery disease.      Review of Systems   All systems were reviewed and negative except for: What is mentioned above    Personal History     Past Medical History:   Diagnosis Date    Asthma     BPPV (benign paroxysmal positional vertigo), right     USES CANE D/T VERTIGO:  UNABLE TO WORK, DOES NOT DRIVE D/T VERTIGO    Chest pain     Depression     Fatigue     GERD (gastroesophageal reflux disease)     OTC    Hypersomnia     Hypertension     Meniere disease     Obesity (BMI 35.0-39.9 without comorbidity)     Pre-diabetes     RBBB     Sleep apnea     Vestibular migraine        Past Surgical History:   Procedure Laterality Date    COLONOSCOPY      COLONOSCOPY N/A 2024    Procedure: COLONOSCOPY to cecum and into TI;  Surgeon: Rudy Melissa MD;  Location: [facility] ENDOSCOPY;  Service:  Gastroenterology;  Laterality: N/A;  Pre: rectal bleeding  Post: hemorhoids    EAR TUBE REMOVAL      AS A CHILD    WISDOM TOOTH EXTRACTION         Family History: family history includes Alcohol abuse in his maternal grandfather and paternal grandmother; Breast cancer in his mother; Dementia in his father; Depression in his brother and father; Heart disease in his mother; Pancreatic cancer in his paternal grandfather; Stroke in his maternal grandmother. Otherwise pertinent FHx was reviewed and not pertinent to current issue.    Social History:  reports that he has never smoked. He has never used smokeless tobacco. He reports that he does not drink alcohol and does not use drugs.    Home Medications:  albuterol sulfate HFA, hydroCHLOROthiazide, losartan, multivitamin with minerals, and naproxen sodium    Allergies:  Allergies   Allergen Reactions    Dog Fennel Shortness Of Breath    Molds & Smuts Shortness Of Breath    Uncaria Tomentosa (Cats Claw) Shortness Of Breath       Objective   Objective     Vitals:   Temp:  [99.3 °F (37.4 °C)] 99.3 °F (37.4 °C)  Heart Rate:  [64-97] 65  Resp:  [18] 18  BP: (127-144)/(77-86) 133/77  Physical Exam    Constitutional: Awake, alert   Eyes: PERRLA, sclerae anicteric, no conjunctival injection   HENT: NCAT, mucous membranes moist   Neck: Supple, no thyromegaly, no lymphadenopathy, trachea midline   Respiratory: Clear to auscultation bilaterally, nonlabored respirations    Cardiovascular: RRR, no murmurs, rubs, or gallops, palpable pedal pulses bilaterally   Gastrointestinal: Positive bowel sounds, soft, nontender, nondistended   Musculoskeletal: No bilateral ankle edema, no clubbing or cyanosis to extremities   Psychiatric: Anxious affect, cooperative   Neurologic: Oriented x 3, strength symmetric in all extremities, Cranial Nerves grossly intact to confrontation, speech clear   Skin: No rashes     Result Review    Result Review:  I have personally reviewed the results from the  time of this admission to 4/21/2025 15:50 EDT and agree with these findings:  [x]  Laboratory list / accordion  []  Microbiology  [x]  Radiology  [x]  EKG/Telemetry   []  Cardiology/Vascular   []  Pathology  []  Old records  []  Other:  Most notable findings include: High-sensitivity troponin 10, 8, blood glucose 119, chest x-ray shows no acute cardiopulmonary findings, 4/21/2513: 10 sinus rhythm rate of 77, QTc 412, incomplete right bundle branch block but no evidence of acute ischemia      Assessment & Plan   The 10-year ASCVD risk score (Efe GUTIÉRREZ, et al., 2019) is: 6.6%    Values used to calculate the score:      Age: 54 years      Sex: Male      Is Non- : No      Diabetic: No      Tobacco smoker: No      Systolic Blood Pressure: 133 mmHg      Is BP treated: Yes      HDL Cholesterol: 45 mg/dL      Total Cholesterol: 162 mg/dL    Assessment / Plan     Brief Patient Summary:  Fred Bhardwaj is a 54 y.o. male who is being evaluated for chest discomfort.    Active Hospital Problems:  Active Hospital Problems    Diagnosis     **Chest pain      Plan:     Chest pain  High-sensitivity troponin 10, 8, trend  Negative D-dimer  EKG nonischemic, repeat in a.m.  Consult to cardiology  Chest x-ray shows no acute cardiopulmonary findings  Check lipid panel  After midnight    Hypertension  Vital signs every 4 hours  Continue medications    VTE Prophylaxis:  Mechanical VTE prophylaxis orders are present.        CODE STATUS:    Code Status (Patient has no pulse and is not breathing): CPR (Attempt to Resuscitate)  Medical Interventions (Patient has pulse or is breathing): Full Support  Level Of Support Discussed With: Patient    Admission Status:  I believe this patient meets observation status.    Electronically signed by YESSICA Tellez, 04/21/25, 3:50 PM EDT.        75 minutes has been spent by [facility] providers in the care of this patient while under observation  status on this date 04/21/25        I have worn appropriate PPE during this patient encounter, sanitized my hands both with entering and exiting patient's room.

## 2025-04-21 NOTE — ED NOTES
"Nursing report ED to floor  Fred Bhardwaj  54 y.o.  male    HPI :  HPI  Stated Reason for Visit: chest pain  History Obtained From: patient    Chief Complaint  Chief Complaint   Patient presents with    Chest Pain       Admitting doctor:   Dakota Manuel MD    Admitting diagnosis:   The encounter diagnosis was Nonspecific chest pain.    Code status:   Current Code Status       Date Active Code Status Order ID Comments User Context       4/21/2025 1550 CPR (Attempt to Resuscitate) [id number]  Joyce Savage APRN ED        Question Answer    Code Status (Patient has no pulse and is not breathing) CPR (Attempt to Resuscitate)    Medical Interventions (Patient has pulse or is breathing) Full Support    Level Of Support Discussed With Patient                    Allergies:   Dog fennel, Molds & smuts, and Uncaria tomentosa (cats claw)    Isolation:   No active isolations    Intake and Output  No intake or output data in the 24 hours ending 04/21/25 1601    Weight:       04/21/25  1442   Weight: 108 kg (238 lb 1.6 oz)       Most recent vitals:   Vitals:    04/21/25 1442 04/21/25 1443 04/21/25 1501 04/21/25 1505   BP: 127/85  133/77    BP Location:       Pulse: 68 64 73 65   Resp: 18   18   Temp:       SpO2:   94% 97%   Weight: 108 kg (238 lb 1.6 oz)      Height: 182.9 cm (72\")          Active LDAs/IV Access:   Lines, Drains & Airways       Active LDAs       None                    Labs (abnormal labs have a star):   Labs Reviewed   COMPREHENSIVE METABOLIC PANEL - Abnormal; Notable for the following components:       Result Value    Glucose 119 (*)     All other components within normal limits    Narrative:     GFR Categories in Chronic Kidney Disease (CKD)      GFR Category          GFR (mL/min/1.73)    Interpretation  G1                     90 or greater         Normal or high (1)  G2                      60-89                Mild decrease (1)  G3a                   45-59                Mild to moderate decrease  G3b     "               30-44                Moderate to severe decrease  G4                    15-29                Severe decrease  G5                    14 or less           Kidney failure          (1)In the absence of evidence of kidney disease, neither GFR category G1 or G2 fulfill the criteria for CKD.    eGFR calculation 2021 CKD-EPI creatinine equation, which does not include race as a factor   TROPONIN - Normal    Narrative:     High Sensitive Troponin T Reference Range:  <14.0 ng/L- Negative Female for AMI  <22.0 ng/L- Negative Male for AMI  >=14 - Abnormal Female indicating possible myocardial injury.  >=22 - Abnormal Male indicating possible myocardial injury.   Clinicians would have to utilize clinical acumen, EKG, Troponin, and serial changes to determine if it is an Acute Myocardial Infarction or myocardial injury due to an underlying chronic condition.        CBC WITH AUTO DIFFERENTIAL - Normal   HIGH SENSITIVITIY TROPONIN T 1HR - Normal    Narrative:     High Sensitive Troponin T Reference Range:  <14.0 ng/L- Negative Female for AMI  <22.0 ng/L- Negative Male for AMI  >=14 - Abnormal Female indicating possible myocardial injury.  >=22 - Abnormal Male indicating possible myocardial injury.   Clinicians would have to utilize clinical acumen, EKG, Troponin, and serial changes to determine if it is an Acute Myocardial Infarction or myocardial injury due to an underlying chronic condition.        RAINBOW DRAW    Narrative:     The following orders were created for panel order [place] Draw.  Procedure                               Abnormality         Status                     ---------                               -----------         ------                     Green Top (Gel)[[id number]]                                  Final result               Lavender Top[[id number]]                                     Final result               Gold Top - SST[[id number]]                                   Final result                Light Blue Top[[id number]]                                   Final result                 Please view results for these tests on the individual orders.   D-DIMER, QUANTITATIVE   CBC AND DIFFERENTIAL    Narrative:     The following orders were created for panel order CBC & Differential.  Procedure                               Abnormality         Status                     ---------                               -----------         ------                     CBC Auto Differential[[id number]]        Normal              Final result                 Please view results for these tests on the individual orders.   GREEN TOP   LAVENDER TOP   GOLD TOP - SST   LIGHT BLUE TOP       EKG:   ECG 12 Lead ED Triage Standing Order; Chest Pain   Preliminary Result   HEART RATE=77  bpm   RR Tmuvdejx=358  ms   OR Interval=160  ms   P Horizontal Axis=7  deg   P Front Axis=72  deg   QRSD Cziyypnp=237  ms   QT Ikqcwcog=423  ms   JSnC=324  ms   QRS Axis=-18  deg   T Wave Axis=50  deg   - ABNORMAL ECG -   Sinus rhythm   Incomplete right bundle branch block   Date and Time of Study:2025-04-21 13:10:05      ECG 12 Lead Chest Pain    (Results Pending)   ECG 12 Lead Chest Pain    (Results Pending)       Meds given in ED:   Medications   sodium chloride 0.9 % flush 10 mL (has no administration in time range)   nitroglycerin (NITROSTAT) SL tablet 0.4 mg (has no administration in time range)   sodium chloride 0.9 % flush 10 mL (has no administration in time range)   sodium chloride 0.9 % flush 10 mL (has no administration in time range)   sodium chloride 0.9 % infusion 40 mL (has no administration in time range)   aspirin chewable tablet 324 mg (324 mg Oral Given 4/21/25 4500)     And   aspirin EC tablet 81 mg (has no administration in time range)   sennosides-docusate (PERICOLACE) 8.6-50 MG per tablet 2 tablet (has no administration in time range)     And   polyethylene glycol (MIRALAX) packet 17 g (has no administration in time range)     And    bisacodyl (DULCOLAX) EC tablet 5 mg (has no administration in time range)     And   bisacodyl (DULCOLAX) suppository 10 mg (has no administration in time range)       Imaging results:  XR Chest 1 View  Result Date: 4/21/2025  1. No acute process.   This report was finalized on 4/21/2025 1:32 PM by Dr. Chong Bailon M.D on Workstation: [id number]        Ambulatory status:   - ad aaron.    Social issues:   Social History     Socioeconomic History    Marital status:     Number of children: 0   Tobacco Use    Smoking status: Never    Smokeless tobacco: Never    Tobacco comments:     Caffeine 1 pint green tea every morning   Vaping Use    Vaping status: Never Used   Substance and Sexual Activity    Alcohol use: No    Drug use: No    Sexual activity: Yes     Partners: Female     Birth control/protection: Condom, Natural family planning/Rhythm, Rhythm       Peripheral Neurovascular  Peripheral Neurovascular (Adult)  Peripheral Neurovascular WDL: WDL    Neuro Cognitive  Neuro Cognitive (Adult)  Cognitive/Neuro/Behavioral WDL: WDL    Learning  Learning Assessment  Learning Readiness and Ability: no barriers identified    Respiratory  Respiratory WDL  Respiratory WDL: WDL    Abdominal Pain  Safety Interventions  Safety Precautions/Falls Reduction: assistive device/personal items within reach, fall reduction program maintained, nonskid shoes/slippers when out of bed    Pain Assessments  Pain (Adult)  (0-10) Pain Rating: Rest: 4    NIH Stroke Scale       Aggie Santoyo RN  04/21/25 16:01 EDT

## 2025-04-22 ENCOUNTER — APPOINTMENT (OUTPATIENT)
Dept: CARDIOLOGY | Facility: HOSPITAL | Age: 55
End: 2025-04-22
Payer: COMMERCIAL

## 2025-04-22 ENCOUNTER — APPOINTMENT (OUTPATIENT)
Dept: CT IMAGING | Facility: HOSPITAL | Age: 55
End: 2025-04-22
Payer: COMMERCIAL

## 2025-04-22 ENCOUNTER — READMISSION MANAGEMENT (OUTPATIENT)
Dept: CALL CENTER | Facility: HOSPITAL | Age: 55
End: 2025-04-22
Payer: COMMERCIAL

## 2025-04-22 VITALS
RESPIRATION RATE: 18 BRPM | HEIGHT: 72 IN | TEMPERATURE: 98.3 F | SYSTOLIC BLOOD PRESSURE: 120 MMHG | WEIGHT: 222 LBS | DIASTOLIC BLOOD PRESSURE: 71 MMHG | HEART RATE: 73 BPM | BODY MASS INDEX: 30.07 KG/M2 | OXYGEN SATURATION: 96 %

## 2025-04-22 LAB
ANION GAP SERPL CALCULATED.3IONS-SCNC: 8 MMOL/L (ref 5–15)
AORTIC DIMENSIONLESS INDEX: 0.83 (DI)
ASCENDING AORTA: 3.5 CM
AV MEAN PRESS GRAD SYS DOP V1V2: 2.9 MMHG
AV VMAX SYS DOP: 119 CM/SEC
BH CV ECHO MEAS - ACS: 2.6 CM
BH CV ECHO MEAS - AO MAX PG: 5.7 MMHG
BH CV ECHO MEAS - AO ROOT DIAM: 3.5 CM
BH CV ECHO MEAS - AO V2 VTI: 28.8 CM
BH CV ECHO MEAS - AVA(I,D): 2.9 CM2
BH CV ECHO MEAS - EDV(CUBED): 189.7 ML
BH CV ECHO MEAS - EDV(MOD-SP2): 130 ML
BH CV ECHO MEAS - EDV(MOD-SP4): 102 ML
BH CV ECHO MEAS - EF(MOD-SP2): 60 %
BH CV ECHO MEAS - EF(MOD-SP4): 63.7 %
BH CV ECHO MEAS - ESV(CUBED): 63.8 ML
BH CV ECHO MEAS - ESV(MOD-SP2): 52 ML
BH CV ECHO MEAS - ESV(MOD-SP4): 37 ML
BH CV ECHO MEAS - FS: 30.4 %
BH CV ECHO MEAS - IVS/LVPW: 0.99 CM
BH CV ECHO MEAS - IVSD: 1.04 CM
BH CV ECHO MEAS - LAT PEAK E' VEL: 7.7 CM/SEC
BH CV ECHO MEAS - LV DIASTOLIC VOL/BSA (35-75): 45.8 CM2
BH CV ECHO MEAS - LV MASS(C)D: 243 GRAMS
BH CV ECHO MEAS - LV MAX PG: 4.7 MMHG
BH CV ECHO MEAS - LV MEAN PG: 2.5 MMHG
BH CV ECHO MEAS - LV SYSTOLIC VOL/BSA (12-30): 16.6 CM2
BH CV ECHO MEAS - LV V1 MAX: 108.3 CM/SEC
BH CV ECHO MEAS - LV V1 VTI: 23.8 CM
BH CV ECHO MEAS - LVIDD: 5.7 CM
BH CV ECHO MEAS - LVIDS: 4 CM
BH CV ECHO MEAS - LVOT AREA: 3.5 CM2
BH CV ECHO MEAS - LVOT DIAM: 2.12 CM
BH CV ECHO MEAS - LVPWD: 1.05 CM
BH CV ECHO MEAS - MED PEAK E' VEL: 6.3 CM/SEC
BH CV ECHO MEAS - MV A DUR: 0.13 SEC
BH CV ECHO MEAS - MV A MAX VEL: 47.6 CM/SEC
BH CV ECHO MEAS - MV DEC SLOPE: 338.7 CM/SEC2
BH CV ECHO MEAS - MV DEC TIME: 0.19 SEC
BH CV ECHO MEAS - MV E MAX VEL: 79.9 CM/SEC
BH CV ECHO MEAS - MV E/A: 1.68
BH CV ECHO MEAS - MV MAX PG: 4.2 MMHG
BH CV ECHO MEAS - MV MEAN PG: 0.92 MMHG
BH CV ECHO MEAS - MV P1/2T: 85.1 MSEC
BH CV ECHO MEAS - MV V2 VTI: 33 CM
BH CV ECHO MEAS - MVA(P1/2T): 2.6 CM2
BH CV ECHO MEAS - MVA(VTI): 2.6 CM2
BH CV ECHO MEAS - PA ACC TIME: 0.15 SEC
BH CV ECHO MEAS - PA V2 MAX: 79.1 CM/SEC
BH CV ECHO MEAS - PULM A REVS DUR: 0.13 SEC
BH CV ECHO MEAS - PULM A REVS VEL: 23.6 CM/SEC
BH CV ECHO MEAS - PULM DIAS VEL: 47.2 CM/SEC
BH CV ECHO MEAS - PULM S/D: 0.7
BH CV ECHO MEAS - PULM SYS VEL: 33.1 CM/SEC
BH CV ECHO MEAS - RAP SYSTOLE: 3 MMHG
BH CV ECHO MEAS - RV MAX PG: 3 MMHG
BH CV ECHO MEAS - RV V1 MAX: 86.4 CM/SEC
BH CV ECHO MEAS - RV V1 VTI: 20.8 CM
BH CV ECHO MEAS - SV(LVOT): 84.2 ML
BH CV ECHO MEAS - SV(MOD-SP2): 78 ML
BH CV ECHO MEAS - SV(MOD-SP4): 65 ML
BH CV ECHO MEAS - SVI(LVOT): 37.8 ML/M2
BH CV ECHO MEAS - SVI(MOD-SP2): 35 ML/M2
BH CV ECHO MEAS - SVI(MOD-SP4): 29.2 ML/M2
BH CV ECHO MEAS - TAPSE (>1.6): 3.2 CM
BH CV ECHO MEASUREMENTS AVERAGE E/E' RATIO: 11.41
BH CV XLRA - RV BASE: 3.8 CM
BH CV XLRA - RV LENGTH: 6.8 CM
BH CV XLRA - RV MID: 2.5 CM
BH CV XLRA - TDI S': 11.9 CM/SEC
BH CV XLRA MEAS LEFT DIST CCA EDV: -20.3 CM/SEC
BH CV XLRA MEAS LEFT DIST CCA PSV: -92.5 CM/SEC
BH CV XLRA MEAS LEFT DIST ICA EDV: -22.1 CM/SEC
BH CV XLRA MEAS LEFT DIST ICA PSV: -58.6 CM/SEC
BH CV XLRA MEAS LEFT ICA/CCA RATIO: 0.78
BH CV XLRA MEAS LEFT MID ICA EDV: -20.9 CM/SEC
BH CV XLRA MEAS LEFT MID ICA PSV: -71.9 CM/SEC
BH CV XLRA MEAS LEFT PROX CCA EDV: 24.3 CM/SEC
BH CV XLRA MEAS LEFT PROX CCA PSV: 147.3 CM/SEC
BH CV XLRA MEAS LEFT PROX ECA PSV: -82.7 CM/SEC
BH CV XLRA MEAS LEFT PROX ICA EDV: -15.4 CM/SEC
BH CV XLRA MEAS LEFT PROX ICA PSV: -70.2 CM/SEC
BH CV XLRA MEAS LEFT PROX SCLA PSV: 93.2 CM/SEC
BH CV XLRA MEAS LEFT VERTEBRAL A PSV: -42.6 CM/SEC
BH CV XLRA MEAS RIGHT DIST CCA EDV: 22 CM/SEC
BH CV XLRA MEAS RIGHT DIST CCA PSV: 114.5 CM/SEC
BH CV XLRA MEAS RIGHT DIST ICA EDV: -29.1 CM/SEC
BH CV XLRA MEAS RIGHT DIST ICA PSV: -84.5 CM/SEC
BH CV XLRA MEAS RIGHT ICA/CCA RATIO: 0.74
BH CV XLRA MEAS RIGHT MID ICA EDV: -23.6 CM/SEC
BH CV XLRA MEAS RIGHT MID ICA PSV: -69.1 CM/SEC
BH CV XLRA MEAS RIGHT PROX CCA EDV: 19.6 CM/SEC
BH CV XLRA MEAS RIGHT PROX CCA PSV: 128.6 CM/SEC
BH CV XLRA MEAS RIGHT PROX ECA PSV: -79.9 CM/SEC
BH CV XLRA MEAS RIGHT PROX ICA EDV: -22.4 CM/SEC
BH CV XLRA MEAS RIGHT PROX ICA PSV: -64.5 CM/SEC
BH CV XLRA MEAS RIGHT PROX SCLA PSV: 118.4 CM/SEC
BH CV XLRA MEAS RIGHT VERTEBRAL A PSV: -43.5 CM/SEC
BUN SERPL-MCNC: 12 MG/DL (ref 6–20)
BUN/CREAT SERPL: 14.5 (ref 7–25)
CALCIUM SPEC-SCNC: 8.9 MG/DL (ref 8.6–10.5)
CHLORIDE SERPL-SCNC: 108 MMOL/L (ref 98–107)
CO2 SERPL-SCNC: 22 MMOL/L (ref 22–29)
CREAT SERPL-MCNC: 0.83 MG/DL (ref 0.76–1.27)
DEPRECATED RDW RBC AUTO: 42.2 FL (ref 37–54)
EGFRCR SERPLBLD CKD-EPI 2021: 102.7 ML/MIN/1.73
ERYTHROCYTE [DISTWIDTH] IN BLOOD BY AUTOMATED COUNT: 13 % (ref 12.3–15.4)
GLUCOSE SERPL-MCNC: 91 MG/DL (ref 65–99)
HCT VFR BLD AUTO: 45.4 % (ref 37.5–51)
HGB BLD-MCNC: 14.9 G/DL (ref 13–17.7)
LEFT ATRIUM VOLUME INDEX: 18.1 ML/M2
LV EF BIPLANE MOD: 61.5 %
MAGNESIUM SERPL-MCNC: 2.5 MG/DL (ref 1.6–2.6)
MCH RBC QN AUTO: 29.4 PG (ref 26.6–33)
MCHC RBC AUTO-ENTMCNC: 32.8 G/DL (ref 31.5–35.7)
MCV RBC AUTO: 89.5 FL (ref 79–97)
PLATELET # BLD AUTO: 184 10*3/MM3 (ref 140–450)
PMV BLD AUTO: 9.4 FL (ref 6–12)
POTASSIUM SERPL-SCNC: 3.8 MMOL/L (ref 3.5–5.2)
QT INTERVAL: 428 MS
QTC INTERVAL: 378 MS
RBC # BLD AUTO: 5.07 10*6/MM3 (ref 4.14–5.8)
SINUS: 3.7 CM
SODIUM SERPL-SCNC: 138 MMOL/L (ref 136–145)
STJ: 2.9 CM
TROPONIN T SERPL HS-MCNC: 12 NG/L
WBC NRBC COR # BLD AUTO: 6.15 10*3/MM3 (ref 3.4–10.8)

## 2025-04-22 PROCEDURE — G0378 HOSPITAL OBSERVATION PER HR: HCPCS

## 2025-04-22 PROCEDURE — 85027 COMPLETE CBC AUTOMATED: CPT | Performed by: NURSE PRACTITIONER

## 2025-04-22 PROCEDURE — 99203 OFFICE O/P NEW LOW 30 MIN: CPT | Performed by: STUDENT IN AN ORGANIZED HEALTH CARE EDUCATION/TRAINING PROGRAM

## 2025-04-22 PROCEDURE — 93306 TTE W/DOPPLER COMPLETE: CPT

## 2025-04-22 PROCEDURE — 84484 ASSAY OF TROPONIN QUANT: CPT | Performed by: NURSE PRACTITIONER

## 2025-04-22 PROCEDURE — 80048 BASIC METABOLIC PNL TOTAL CA: CPT | Performed by: NURSE PRACTITIONER

## 2025-04-22 PROCEDURE — 75574 CT ANGIO HRT W/3D IMAGE: CPT

## 2025-04-22 PROCEDURE — 93880 EXTRACRANIAL BILAT STUDY: CPT

## 2025-04-22 PROCEDURE — 25510000001 IOPAMIDOL PER 1 ML: Performed by: EMERGENCY MEDICINE

## 2025-04-22 PROCEDURE — 93005 ELECTROCARDIOGRAM TRACING: CPT | Performed by: NURSE PRACTITIONER

## 2025-04-22 PROCEDURE — 83735 ASSAY OF MAGNESIUM: CPT | Performed by: NURSE PRACTITIONER

## 2025-04-22 PROCEDURE — 93010 ELECTROCARDIOGRAM REPORT: CPT | Performed by: INTERNAL MEDICINE

## 2025-04-22 PROCEDURE — 93880 EXTRACRANIAL BILAT STUDY: CPT | Performed by: SURGERY

## 2025-04-22 RX ORDER — HYDROCHLOROTHIAZIDE 12.5 MG/1
12.5 TABLET ORAL DAILY
Qty: 30 TABLET | Refills: 3 | Status: SHIPPED | OUTPATIENT
Start: 2025-04-22

## 2025-04-22 RX ORDER — IVABRADINE 5 MG/1
15 TABLET, FILM COATED ORAL ONCE
OUTPATIENT
Start: 2025-04-22 | End: 2025-04-22

## 2025-04-22 RX ORDER — NITROGLYCERIN 0.4 MG/1
0.8 TABLET SUBLINGUAL
Status: COMPLETED | OUTPATIENT
Start: 2025-04-22 | End: 2025-04-22

## 2025-04-22 RX ORDER — METOPROLOL TARTRATE 1 MG/ML
5 INJECTION, SOLUTION INTRAVENOUS
OUTPATIENT
Start: 2025-04-22

## 2025-04-22 RX ORDER — METOPROLOL TARTRATE 50 MG
100 TABLET ORAL ONCE
OUTPATIENT
Start: 2025-04-22

## 2025-04-22 RX ORDER — NITROGLYCERIN 0.4 MG/1
0.4 TABLET SUBLINGUAL
Status: COMPLETED | OUTPATIENT
Start: 2025-04-22 | End: 2025-04-22

## 2025-04-22 RX ORDER — METOPROLOL TARTRATE 50 MG
50 TABLET ORAL ONCE
OUTPATIENT
Start: 2025-04-22

## 2025-04-22 RX ORDER — IOPAMIDOL 755 MG/ML
100 INJECTION, SOLUTION INTRAVASCULAR
Status: COMPLETED | OUTPATIENT
Start: 2025-04-22 | End: 2025-04-22

## 2025-04-22 RX ORDER — METOPROLOL TARTRATE 50 MG
200 TABLET ORAL ONCE
OUTPATIENT
Start: 2025-04-22 | End: 2025-04-22

## 2025-04-22 RX ORDER — METOPROLOL TARTRATE 50 MG
50 TABLET ORAL
OUTPATIENT
Start: 2025-04-22

## 2025-04-22 RX ORDER — METOPROLOL TARTRATE 50 MG
150 TABLET ORAL ONCE
OUTPATIENT
Start: 2025-04-22

## 2025-04-22 RX ADMIN — NITROGLYCERIN 0.8 MG: 0.4 TABLET SUBLINGUAL at 11:34

## 2025-04-22 RX ADMIN — ASPIRIN 81 MG: 81 TABLET, COATED ORAL at 08:24

## 2025-04-22 RX ADMIN — IOPAMIDOL 95 ML: 755 INJECTION, SOLUTION INTRAVENOUS at 11:36

## 2025-04-22 RX ADMIN — Medication 1 TABLET: at 08:24

## 2025-04-22 RX ADMIN — LOSARTAN POTASSIUM 50 MG: 50 TABLET, FILM COATED ORAL at 08:24

## 2025-04-22 RX ADMIN — Medication 10 ML: at 08:25

## 2025-04-22 RX ADMIN — HYDROCHLOROTHIAZIDE 12.5 MG: 12.5 TABLET ORAL at 08:24

## 2025-04-22 NOTE — PROGRESS NOTES
MD ATTESTATION NOTE    The KJ and I have discussed this patient's history, physical exam, and treatment plan.  I have reviewed the documentation and personally had a face to face interaction with the patient. I affirm the documentation and agree with the treatment and plan.  The attached note describes my personal findings.      I provided a substantive portion of the care of the patient.  I personally performed the physical exam in its entirety, and below are my findings.       Brief HPI: Patient mated with chest pain and short of breath.    PHYSICAL EXAM  ED Triage Vitals   Temp Heart Rate Resp BP SpO2   04/21/25 1307 04/21/25 1307 04/21/25 1307 04/21/25 1316 04/21/25 1307   99.3 °F (37.4 °C) 97 18 144/86 97 %      Temp src Heart Rate Source Patient Position BP Location FiO2 (%)   04/21/25 1700 04/21/25 1700 04/21/25 1700 04/21/25 1316 --   Oral Monitor Lying Left arm          GENERAL: no acute distress  HENT: nares patent  EYES: no scleral icterus  CV: regular rhythm, normal rate  RESPIRATORY: normal effort  ABDOMEN: soft  MUSCULOSKELETAL: no deformity  NEURO: alert, moves all extremities, follows commands  PSYCH:  calm, cooperative  SKIN: warm, dry    Vital signs and nursing notes reviewed.        Plan: Cardiology consult.  The patient's troponin trended from 10 to 8 to 12.  Dimer negative.  EKG without acute ischemia.  Echo pending.

## 2025-04-22 NOTE — DISCHARGE INSTRUCTIONS
Return to the emergency department for symptoms recurrence or exacerbation  Follow-up with your PCP in 1 week  CTA coronary showed no evidence of significant heart disease  Carotid ultrasound negative acute

## 2025-04-22 NOTE — OUTREACH NOTE
Prep Survey      Flowsheet Row Responses   [facility] patient discharged from? [place]   Is LACE score < 7 ? Yes   Eligibility [facility]   Date of Admission 04/21/25   Date of Discharge 04/22/25   Discharge Disposition Home or Self Care   Discharge diagnosis Chest pain   Does the patient have one of the following disease processes/diagnoses(primary or secondary)? Other   Does the patient have Home health ordered? No   Is there a DME ordered? No   Prep survey completed? Yes            IVETH GRIFFIN - Registered Nurse

## 2025-04-22 NOTE — PLAN OF CARE
Goal Outcome Evaluation:      Pt. Currently denies chest pain. Trops 10,8,12. NPO for cardio consult. Echo ordered.

## 2025-04-22 NOTE — CONSULTS
Date of Hospital Visit: 25  Encounter Provider: Ambrocio Garcia MD  Place of Service: [facility] CARDIOLOGY  Patient Name: Fred Bhardwaj  :1970  Referral Provider: No ref. provider found    Chief complaint  Chest pain    History of Present Illness  54-year-old with hypertension, prediabetes, depression, GERD who presented with chest pain over the past couple of weeks.  He notes that the symptoms are typically brought on by stress or when he is emotional.  He does not have many symptoms with exertion.    On arrival to the ED, he was slightly hypertensive up to 144/86, other vitals were stable and within normal limits.  Blood work with normal CBC and BMP and negative troponins x 3.  EKG with normal sinus rhythm and right bundle branch block.  Echocardiogram performed this morning with normal EF and normal wall motion with borderline concentric hypertrophy.      Of note, he was evaluated by Dr. Ahn in 2019 for similar symptoms and stress testing at that time was normal.    Past Medical History:   Diagnosis Date    Asthma     BPPV (benign paroxysmal positional vertigo), right     USES CANE D/T VERTIGO:  UNABLE TO WORK, DOES NOT DRIVE D/T VERTIGO    Chest pain     Depression     Fatigue     GERD (gastroesophageal reflux disease)     OTC    Hypersomnia     Hypertension     Meniere disease     Obesity (BMI 35.0-39.9 without comorbidity)     Pre-diabetes     RBBB     Sleep apnea     Vestibular migraine        Past Surgical History:   Procedure Laterality Date    COLONOSCOPY      COLONOSCOPY N/A 2024    Procedure: COLONOSCOPY to cecum and into TI;  Surgeon: Rudy Melissa MD;  Location: [facility] ENDOSCOPY;  Service: Gastroenterology;  Laterality: N/A;  Pre: rectal bleeding  Post: hemorhoids    EAR TUBE REMOVAL      AS A CHILD    WISDOM TOOTH EXTRACTION         Medications Prior to Admission   Medication Sig Dispense Refill Last Dose/Taking    losartan (COZAAR) 50 MG  "tablet TAKE 1 TABLET BY MOUTH EVERY DAY 90 tablet 1 4/21/2025 Morning    multivitamin with minerals tablet tablet Take 1 tablet by mouth Daily.   4/21/2025 Morning    naproxen sodium (ALEVE) 220 MG tablet Take 2 tablets by mouth Daily As Needed for Headache. \"For migraines.\"   Past Week    albuterol sulfate  (90 Base) MCG/ACT inhaler Inhale 2 puffs Every 4 (Four) Hours As Needed for Wheezing. 18 g 3     hydroCHLOROthiazide 12.5 MG tablet TAKE 1 TABLET BY MOUTH EVERY DAY 30 tablet 3     Omeprazole-Sodium Bicarbonate  MG capsule Take  by mouth.   Noon       Current Meds  Scheduled Meds:aspirin, 81 mg, Oral, Daily  hydroCHLOROthiazide, 12.5 mg, Oral, Daily  losartan, 50 mg, Oral, Daily  multivitamin with minerals, 1 tablet, Oral, Daily  senna-docusate sodium, 2 tablet, Oral, BID  sodium chloride, 10 mL, Intravenous, Q12H      Continuous Infusions:   PRN Meds:.  albuterol sulfate HFA    senna-docusate sodium **AND** polyethylene glycol **AND** bisacodyl **AND** bisacodyl    nitroglycerin    sodium chloride    sodium chloride    sodium chloride    Allergies as of 04/21/2025 - Reviewed 04/21/2025   Allergen Reaction Noted    Dog fennel Shortness Of Breath 08/28/2017    Molds & smuts Shortness Of Breath 08/11/2016    Uncaria tomentosa (cats claw) Shortness Of Breath 08/28/2017       Social History     Socioeconomic History    Marital status:     Number of children: 0   Tobacco Use    Smoking status: Never    Smokeless tobacco: Never    Tobacco comments:     Caffeine 1 pint green tea every morning   Vaping Use    Vaping status: Never Used   Substance and Sexual Activity    Alcohol use: No    Drug use: No    Sexual activity: Yes     Partners: Female     Birth control/protection: Condom, Natural family planning/Rhythm, Rhythm       Family History   Problem Relation Age of Onset    Breast cancer Mother     Heart disease Mother     Depression Father     Dementia Father     Depression Brother         death " "by suicde 2012    Stroke Maternal Grandmother     Alcohol abuse Maternal Grandfather     Alcohol abuse Paternal Grandmother     Pancreatic cancer Paternal Grandfather     Malig Hyperthermia Neg Hx        REVIEW OF SYSTEMS:   12 point ROS was performed and is negative except as outlined in HPI          Objective:   Temp:  [97.8 °F (36.6 °C)-99.3 °F (37.4 °C)] 98.3 °F (36.8 °C)  Heart Rate:  [51-97] 59  Resp:  [17-18] 18  BP: (115-144)/(72-89) 119/80  Body mass index is 30.11 kg/m².  Flowsheet Rows      Flowsheet Row First Filed Value   Admission Height 182.9 cm (72\") Documented at 04/21/2025 1442   Admission Weight 108 kg (238 lb 1.6 oz) Documented at 04/21/2025 1442          Vitals:    04/22/25 0807   BP: 119/80   Pulse: 59   Resp: 18   Temp: 98.3 °F (36.8 °C)   SpO2: 97%       GEN: no distress, alert and oriented  HEENT: NACT, EOMI, moist mucous membranes  Lungs: CTAB, no wheezes, rales or rhonchi  CV: normal rate, regular rhythm, normal S1, S2, no murmurs, +2 radial pulses b/l, no carotid bruit  Abdomen: soft, nontender, nondistended, NABS  Extremities: no edema  Skin: no rash, warm, dry  Heme/Lymph: no bruising  Psych: organized thought, normal behavior and affect      Lab Review:   Results from last 7 days   Lab Units 04/22/25  0319 04/21/25  1315   SODIUM mmol/L 138 138   POTASSIUM mmol/L 3.8 3.9   CHLORIDE mmol/L 108* 105   CO2 mmol/L 22.0 25.0   BUN mg/dL 12 12   CREATININE mg/dL 0.83 0.83   CALCIUM mg/dL 8.9 9.1   BILIRUBIN mg/dL  --  0.3   ALK PHOS U/L  --  74   ALT (SGPT) U/L  --  29   AST (SGOT) U/L  --  25   GLUCOSE mg/dL 91 119*     Results from last 7 days   Lab Units 04/22/25  0319 04/21/25  1439 04/21/25  1315   HSTROP T ng/L 12 8 10     Results from last 7 days   Lab Units 04/22/25  0319 04/21/25  1315   WBC 10*3/mm3 6.15 4.91   HEMOGLOBIN g/dL 14.9 14.9   HEMATOCRIT % 45.4 44.8   PLATELETS 10*3/mm3 184 194         Results from last 7 days   Lab Units 04/22/25  0319   MAGNESIUM mg/dL 2.5         I " personally viewed and interpreted the patient's EKG/Telemetry data)      Chest pain    Assessment and Plan:  #Chest pain  #HTN  #preDM  #Depression    54-year-old with hypertension, prediabetes, depression, GERD who presented with chest pain.  EKG at his baseline.  Troponins are normal.  Echocardiogram is normal.  Stress test in 2019 for similar symptoms was normal.  Discomfort has both typical and atypical features, will obtain CT coronary for further assessment.  If normal can be discharged home from cardiac standpoint.    Ambrocio Yarbrough MD, [facility]  04/22/25  08:53 EDT.

## 2025-04-22 NOTE — DISCHARGE SUMMARY
ED OBSERVATION PROGRESS/DISCHARGE SUMMARY    Date of Admission: 4/21/2025   LOS: 0 days   PCP: Radha Pabon APRN        [facility] Outcome:   Fred Bhardwaj is a  54 y.o. male who was admitted to the ED observation unit with complaint of chest discomfort.   HS troponin were flat at 10, 8, 12. D-Dimer negative. EKG showed sinus rhythm with an incomplete RBBB. Vital signs stable.     No acute event overnight.  Echocardiogram shows LVEF 61.5% and LVH.  Dr. Yarbrough with cardiology evaluated patient and ordered CTA coronary which showed mild amount of coronary plaque.  Patient complained of left-sided neck pain when getting upset therefore carotid ultrasound was obtained and showed no evidence of significant stenosis.  Patient will be discharged home and to follow-up with his PCP outpatient        ROS:  General: no fevers, chills  Respiratory: no cough, dyspnea  Cardiovascular: no chest pain, palpitations  Abdomen: No abdominal pain, nausea, vomiting, or diarrhea  Neurologic: No focal weakness    Objective   Physical Exam:  I have reviewed the vital signs.  Temp:  [97.9 °F (36.6 °C)-99.3 °F (37.4 °C)] 97.9 °F (36.6 °C)  Heart Rate:  [57-97] 69  Resp:  [18] 18  BP: (119-144)/(77-89) 119/84  General Appearance:    Alert, cooperative, no distress  Head:    Normocephalic, atraumatic  Eyes:    Sclerae anicteric  Neck:   Supple, no mass  Lungs: Clear to auscultation bilaterally, respirations unlabored  Heart: Regular rate and rhythm, S1 and S2 normal, no murmur, rub or gallop  Abdomen:  Soft, nontender, bowel sounds active, nondistended  Extremities: No clubbing, cyanosis, or edema to lower extremities  Pulses:  2+ and symmetric in distal lower extremities  Skin: No rashes   Neurologic: Oriented x3, Normal strength to extremities    Results Review:    I have reviewed the labs, radiology results and diagnostic studies.    Results from last 7 days   Lab Units 04/21/25  1315   WBC 10*3/mm3 4.91   HEMOGLOBIN g/dL 14.9   HEMATOCRIT  % 44.8   PLATELETS 10*3/mm3 194     Results from last 7 days   Lab Units 04/21/25  1315   SODIUM mmol/L 138   POTASSIUM mmol/L 3.9   CHLORIDE mmol/L 105   CO2 mmol/L 25.0   BUN mg/dL 12   CREATININE mg/dL 0.83   CALCIUM mg/dL 9.1   BILIRUBIN mg/dL 0.3   ALK PHOS U/L 74   ALT (SGPT) U/L 29   AST (SGOT) U/L 25   GLUCOSE mg/dL 119*     Imaging Results (Last 24 Hours)       Procedure Component Value Units Date/Time    XR Chest 1 View [[id number]] Collected: 04/21/25 1331     Updated: 04/21/25 1335    Narrative:      XR CHEST 1 VW-4/21/2025     HISTORY: Chest pain.     Heart size is within normal limits. Lungs appear free of acute  infiltrates. 2 images are submitted. There are minor degenerative  changes in the spine.       Impression:      1. No acute process.        This report was finalized on 4/21/2025 1:32 PM by Dr. Chong Bailon M.D on Workstation: [id number]               I have reviewed the medications.     Discharge Medications        Continue These Medications        Instructions Start Date   albuterol sulfate  (90 Base) MCG/ACT inhaler  Commonly known as: PROVENTIL HFA;VENTOLIN HFA;PROAIR HFA   2 puffs, Inhalation, Every 4 Hours PRN      hydroCHLOROthiazide 12.5 MG tablet   12.5 mg, Oral, Daily      losartan 50 MG tablet  Commonly known as: COZAAR   50 mg, Oral, Daily      multivitamin with minerals tablet tablet   1 tablet, Daily      naproxen sodium 220 MG tablet  Commonly known as: ALEVE   440 mg, Daily PRN             ASK your doctor about these medications        Instructions Start Date   Omeprazole-Sodium Bicarbonate  MG capsule   Oral              ---------------------------------------------------------------------------------------------  Assessment & Plan   Assessment/Problem List    Chest pain      Plan:    Chest pain  High-sensitivity troponin 10, 8, trend  Negative D-dimer  EKG nonischemic, repeat in a.m.  Consult to cardiology  Chest x-ray shows no acute cardiopulmonary  findings  Check lipid panel       Hypertension  Vital signs every 4 hours  Continue medications    Disposition: Home    Follow-up after Discharge: PCP    This note will serve as a discharge summary    Sanjana Boland, YESSICA 04/22/25 00:12 EDT    I have worn appropriate PPE during this patient encounter, sanitized my hands both with entering and exiting patient's room.      30 minutes has been spent by [facility] providers in the care of this patient while under observation status on this date 04/22/25

## 2025-04-23 ENCOUNTER — TRANSITIONAL CARE MANAGEMENT TELEPHONE ENCOUNTER (OUTPATIENT)
Dept: CALL CENTER | Facility: HOSPITAL | Age: 55
End: 2025-04-23
Payer: COMMERCIAL

## 2025-04-23 NOTE — OUTREACH NOTE
Call Center TCM Note      Flowsheet Row Responses   [facility] patient discharged from? [place]   Does the patient have one of the following disease processes/diagnoses(primary or secondary)? Other   TCM attempt successful? No  [VR-Spouse]   Unsuccessful attempts Attempt 1            Kyung HWANG - Registered Nurse    4/23/2025, 11:22 EDT

## 2025-04-23 NOTE — CASE MANAGEMENT/SOCIAL WORK
Case Management Discharge Note      Final Note: Home via private vehicle         Selected Continued Care - Discharged on 4/22/2025 Admission date: 4/21/2025 - Discharge disposition: Home or Self Care      Destination    No services have been selected for the patient.                Durable Medical Equipment    No services have been selected for the patient.                Dialysis/Infusion    No services have been selected for the patient.                Home Medical Care    No services have been selected for the patient.                Therapy    No services have been selected for the patient.                Community Resources    No services have been selected for the patient.                Community & DME    No services have been selected for the patient.                    Transportation Services  Private: Car    Final Discharge Disposition Code: 01 - home or self-care

## 2025-04-23 NOTE — OUTREACH NOTE
Call Center TCM Note      Flowsheet Row Responses   [facility] patient discharged from? [place]   Does the patient have one of the following disease processes/diagnoses(primary or secondary)? Other   TCM attempt successful? No   Unsuccessful attempts Attempt 2   Call Status Left message            Kyung Mueller Registered Nurse    4/23/2025, 14:57 EDT

## 2025-04-24 ENCOUNTER — TRANSITIONAL CARE MANAGEMENT TELEPHONE ENCOUNTER (OUTPATIENT)
Dept: CALL CENTER | Facility: HOSPITAL | Age: 55
End: 2025-04-24
Payer: COMMERCIAL

## 2025-04-24 NOTE — OUTREACH NOTE
Call Center TCM Note      Flowsheet Row Responses   [facility] patient discharged from? [place]   Does the patient have one of the following disease processes/diagnoses(primary or secondary)? Other   TCM attempt successful? No   Unsuccessful attempts Attempt 3  [Naina on verbal release,  no answer]            Reema SARABIA - Registered Nurse    4/24/2025, 08:39 EDT

## 2025-05-01 ENCOUNTER — OFFICE VISIT (OUTPATIENT)
Dept: FAMILY MEDICINE CLINIC | Facility: CLINIC | Age: 55
End: 2025-05-01
Payer: COMMERCIAL

## 2025-05-01 VITALS
OXYGEN SATURATION: 97 % | SYSTOLIC BLOOD PRESSURE: 142 MMHG | WEIGHT: 221 LBS | HEIGHT: 72 IN | DIASTOLIC BLOOD PRESSURE: 90 MMHG | HEART RATE: 64 BPM | RESPIRATION RATE: 18 BRPM | BODY MASS INDEX: 29.93 KG/M2

## 2025-05-01 DIAGNOSIS — K21.9 GASTROESOPHAGEAL REFLUX DISEASE WITHOUT ESOPHAGITIS: ICD-10-CM

## 2025-05-01 DIAGNOSIS — I10 PRIMARY HYPERTENSION: ICD-10-CM

## 2025-05-01 DIAGNOSIS — I51.7 LVH (LEFT VENTRICULAR HYPERTROPHY): ICD-10-CM

## 2025-05-01 DIAGNOSIS — Z80.1 FAMILY HISTORY OF LUNG CANCER: ICD-10-CM

## 2025-05-01 DIAGNOSIS — R91.8 LUNG NODULES: Primary | ICD-10-CM

## 2025-05-01 RX ORDER — LOSARTAN POTASSIUM AND HYDROCHLOROTHIAZIDE 25; 100 MG/1; MG/1
1 TABLET ORAL DAILY
Qty: 30 TABLET | Refills: 3 | Status: SHIPPED | OUTPATIENT
Start: 2025-05-01

## 2025-05-01 RX ORDER — METOPROLOL SUCCINATE 25 MG/1
25 TABLET, EXTENDED RELEASE ORAL DAILY
Qty: 30 TABLET | Refills: 3 | Status: SHIPPED | OUTPATIENT
Start: 2025-05-01

## 2025-05-01 NOTE — PROGRESS NOTES
Subjective   Fred Bhardwaj is a 55 y.o. male.     Chief Complaint   Patient presents with    Hospital Follow Up Visit        History of Present Illness     History of Present Illness  The patient presents for a hospital follow-up accompanied by his wife.    He continues to experience chest discomfort. He has known bundle branch block. A recent evaluation revealed borderline left ventricular cavity dilatation and mild nonobstructive coronary artery disease. The left ventricular ejection fraction is in the 60s, indicating good function, but there is some hypertrophy of the left ventricle. He does not have a regular cardiologist.    Pulmonary nodules were identified in the left upper lobe, and a follow-up CT is recommended in a year. There is a family history of lung cancer, as his father was diagnosed with it. He has no history of smoking but worked in [employer] for about 2.5 years. He reports that has had COVID-19 twice.    He has been diagnosed with gastroesophageal reflux disease (GERD), which he manages intermittently with omeprazole since 01/2025. Symptoms are exacerbated by stress and frustration. He experiences shortness of breath during strenuous activities such as yard work but not during his regular exercise routine. There is no persistent cough or lower extremity edema reported.    Blood pressure has been elevated since 2022, for which hydrochlorothiazide was prescribed. Current medications include losartan 50 mg and hydrochlorothiazide 12.5 mg. He reports that his blood pressure has remained elevated despite medication.    SOCIAL HISTORY  He does not smoke. He worked in [employer] and [employer] for about 2.5 years.    FAMILY HISTORY  His father had lung cancer.       The following portions of the patient's history were reviewed and updated as appropriate: allergies, current medications, past family history, past medical history, past social history, past surgical history and problem list.    Review  of Systems   Constitutional:  Negative for chills, fatigue and fever.   Eyes:  Negative for blurred vision and double vision.   Respiratory:  Positive for shortness of breath (with exertion). Negative for cough, chest tightness and wheezing.    Cardiovascular:  Positive for chest pain. Negative for palpitations and leg swelling.   Neurological:  Negative for dizziness, weakness and headache.       Objective   Physical Exam  Vitals and nursing note reviewed.   Constitutional:       Appearance: He is well-developed.   HENT:      Head: Normocephalic and atraumatic.   Eyes:      Conjunctiva/sclera: Conjunctivae normal.      Pupils: Pupils are equal, round, and reactive to light.   Neck:      Thyroid: No thyromegaly.   Cardiovascular:      Rate and Rhythm: Normal rate and regular rhythm.      Pulses: Normal pulses.      Heart sounds: Normal heart sounds. No murmur heard.     No friction rub. No gallop.   Pulmonary:      Effort: Pulmonary effort is normal.      Breath sounds: Normal breath sounds.   Musculoskeletal:      Cervical back: Normal range of motion and neck supple.   Lymphadenopathy:      Cervical: No cervical adenopathy.   Skin:     General: Skin is warm and dry.      Capillary Refill: Capillary refill takes 2 to 3 seconds.   Neurological:      Mental Status: He is alert and oriented to person, place, and time.      Cranial Nerves: No cranial nerve deficit.   Psychiatric:         Behavior: Behavior normal.         Thought Content: Thought content normal.         Judgment: Judgment normal.         Vitals:    05/01/25 0931   BP: 142/90   Pulse: 64   Resp: 18   SpO2: 97%     Body mass index is 29.97 kg/m².      Procedures    Assessment & Plan   Problems Addressed this Visit       Primary hypertension    Relevant Medications    losartan-hydrochlorothiazide (Hyzaar) 100-25 MG per tablet    metoprolol succinate XL (Toprol XL) 25 MG 24 hr tablet    Other Relevant Orders    Ambulatory Referral to Cardiology     Other  Visit Diagnoses         Lung nodules    -  Primary    Relevant Orders    Ambulatory Referral to Multi[facility]Disciplinary Clinic      Family history of lung cancer        Relevant Orders    Ambulatory Referral to Multi-Disciplinary Clinic      LVH (left ventricular hypertrophy)        Relevant Medications    metoprolol succinate XL (Toprol XL) 25 MG 24 hr tablet    Other Relevant Orders    Ambulatory Referral to Cardiology      Gastroesophageal reflux disease without esophagitis              Diagnoses         Codes Comments      Lung nodules    -  Primary ICD-10-CM: R91.8  ICD-9-CM: 793.19       Family history of lung cancer     ICD-10-CM: Z80.1  ICD-9-CM: V16.1       Primary hypertension     ICD-10-CM: I10  ICD-9-CM: 401.9       LVH (left ventricular hypertrophy)     ICD-10-CM: I51.7  ICD-9-CM: 429.3       Gastroesophageal reflux disease without esophagitis     ICD-10-CM: K21.9  ICD-9-CM: 530.81             Assessment & Plan  1. Left ventricular hypertrophy.  - Borderline left ventricular cavity dilatation and indeterminate diastolic function noted.  - Left ventricular ejection fraction is in the 60s, indicating good function, but mild hypertrophy is present.  - Referral to cardiologist Dr. Naina Siegel for further evaluation and management.  - New medications prescribed to manage hypertrophy and blood pressure.    2. Mild nonobstructive coronary artery disease.  - Minimal blockages in the coronary arteries identified via CT scan.  - Continued monitoring of coronary artery disease recommended.  - No immediate intervention required, but regular follow-up advised.    3. Pulmonary nodules.  - Presence of pulmonary nodules in the left upper lobe detected.  - Referral to multidisciplinary lung clinic for further evaluation.  - Follow-up CT scan scheduled in one year to monitor nodule stability.  - Discussion of potential causes including past respiratory illnesses and family history of lung cancer.    4. Hypertension.  -  Blood pressure slightly elevated during the visit.  - Increase in losartan dosage to 100 mg and hydrochlorothiazide to 25 mg.  - Initiation of low-dose extended-release beta blocker to manage left ventricular hypertrophy.  - Discussion of potential side effects of beta blockers, including shortness of breath and fatigue.    5. Gastroesophageal reflux disease (GERD).  - Intermittent use of omeprazole since January 2025.  - Continued use of omeprazole as needed advised.  - Monitoring of symptoms and effectiveness of current medication.    Follow-up  - Follow-up appointment scheduled in a couple of weeks to assess tolerance to new medications and monitor blood pressure levels.              Return in about 2 weeks (around 5/15/2025) for Recheck BP.    Patient or patient representative verbalized consent for the use of Ambient Listening during the visit with  YESSICA Akers for chart documentation. 5/1/2025  09:59 EDT

## 2025-05-15 ENCOUNTER — OFFICE VISIT (OUTPATIENT)
Dept: FAMILY MEDICINE CLINIC | Facility: CLINIC | Age: 55
End: 2025-05-15
Payer: COMMERCIAL

## 2025-05-15 VITALS
HEIGHT: 72 IN | WEIGHT: 221 LBS | DIASTOLIC BLOOD PRESSURE: 78 MMHG | HEART RATE: 82 BPM | RESPIRATION RATE: 18 BRPM | BODY MASS INDEX: 29.93 KG/M2 | SYSTOLIC BLOOD PRESSURE: 112 MMHG | OXYGEN SATURATION: 97 %

## 2025-05-15 DIAGNOSIS — I10 PRIMARY HYPERTENSION: Primary | ICD-10-CM

## 2025-05-15 LAB
ALBUMIN SERPL-MCNC: 4.4 G/DL (ref 3.5–5.2)
ALBUMIN/GLOB SERPL: 1.8 G/DL
ALP SERPL-CCNC: 66 U/L (ref 39–117)
ALT SERPL-CCNC: 29 U/L (ref 1–41)
AST SERPL-CCNC: 26 U/L (ref 1–40)
BILIRUB SERPL-MCNC: 0.3 MG/DL (ref 0–1.2)
BUN SERPL-MCNC: 20 MG/DL (ref 6–20)
BUN/CREAT SERPL: 22.5 (ref 7–25)
CALCIUM SERPL-MCNC: 9.1 MG/DL (ref 8.6–10.5)
CHLORIDE SERPL-SCNC: 107 MMOL/L (ref 98–107)
CO2 SERPL-SCNC: 25.6 MMOL/L (ref 22–29)
CREAT SERPL-MCNC: 0.89 MG/DL (ref 0.76–1.27)
EGFRCR SERPLBLD CKD-EPI 2021: 100 ML/MIN/1.73
GLOBULIN SER CALC-MCNC: 2.4 GM/DL
GLUCOSE SERPL-MCNC: 89 MG/DL (ref 65–99)
POTASSIUM SERPL-SCNC: 3.9 MMOL/L (ref 3.5–5.2)
PROT SERPL-MCNC: 6.8 G/DL (ref 6–8.5)
SODIUM SERPL-SCNC: 143 MMOL/L (ref 136–145)

## 2025-05-15 PROCEDURE — 99213 OFFICE O/P EST LOW 20 MIN: CPT | Performed by: NURSE PRACTITIONER

## 2025-05-15 NOTE — PROGRESS NOTES
Subjective   Fred Bhardwaj is a 55 y.o. male.     Chief Complaint   Patient presents with    Hypertension    Fatigue    Shortness of Breath     Pt does not report any improvement since last time, but that he is more short of breath and tired        History of Present Illness     History of Present Illness  The patient presents for evaluation of fatigue, anxiety, and hypertension.    He reports persistent fatigue, which he attributes to his current state of stress, insomnia, and anxiety. He also experiences chest pain during episodes of heightened anxiety, which he manages through relaxation techniques such as yard work or pacing. These activities typically alleviate his symptoms, although not immediately. He expresses concern about his perceived lack of strength.    He has an upcoming appointment with Dr. Siegel, a cardiologist, on 06/09/2025 and a consultation with a pulmonologist scheduled for 05/20/2025. He inquires if his sleep pattern will affect his blood pressure.       The following portions of the patient's history were reviewed and updated as appropriate: allergies, current medications, past family history, past medical history, past social history, past surgical history and problem list.    Review of Systems   Respiratory:  Positive for shortness of breath. Negative for chest tightness and wheezing.    Cardiovascular:  Positive for chest pain and palpitations. Negative for leg swelling.       Objective   Physical Exam  Vitals and nursing note reviewed.   Constitutional:       Appearance: He is well-developed.   HENT:      Head: Normocephalic and atraumatic.   Eyes:      Conjunctiva/sclera: Conjunctivae normal.      Pupils: Pupils are equal, round, and reactive to light.   Neck:      Thyroid: No thyromegaly.   Cardiovascular:      Rate and Rhythm: Normal rate and regular rhythm.      Pulses: Normal pulses.      Heart sounds: Normal heart sounds. No murmur heard.     No friction rub. No gallop.    Pulmonary:      Effort: Pulmonary effort is normal.      Breath sounds: Normal breath sounds.   Musculoskeletal:      Cervical back: Normal range of motion and neck supple.   Lymphadenopathy:      Cervical: No cervical adenopathy.   Skin:     General: Skin is warm and dry.      Capillary Refill: Capillary refill takes 2 to 3 seconds.   Neurological:      Mental Status: He is alert and oriented to person, place, and time.      Cranial Nerves: No cranial nerve deficit.   Psychiatric:         Behavior: Behavior normal.         Thought Content: Thought content normal.         Judgment: Judgment normal.         Vitals:    05/15/25 0900   BP: 112/78   Pulse: 82   Resp: 18   SpO2: 97%     Body mass index is 29.97 kg/m².      Procedures    Assessment & Plan   Problems Addressed this Visit       Primary hypertension - Primary    Relevant Orders    Comprehensive Metabolic Panel     Diagnoses         Codes Comments      Primary hypertension    -  Primary ICD-10-CM: I10  ICD-9-CM: 401.9             Assessment & Plan  1. Fatigue.  - Reports feeling tired and not as strong as he would like to be.  - Insomnia and anxiety are likely contributing to his fatigue.  - All previous labs were normal.  - Advised to manage stress and anxiety to improve sleep quality.    2. Anxiety.  - Experiences chest pain when anxious, which subsides with relaxation techniques such as yard work or pacing.  - Chest pain alleviates with relaxation and activity.  - Discussed the impact of stress and insomnia on overall health.  - Advised to continue relaxation techniques to manage anxiety.    3. Hypertension.  - Blood pressure is well-controlled on current medication regimen.  - Explained that uncontrolled blood pressure can lead to left ventricular hypertrophy.  - CMP will be repeated today to monitor kidney function and electrolyte levels due to diuretic therapy.  - Referral to cardiologist scheduled for 05/09/2025.    4. Pulmonary follow-up.  -  Appointment with pulmonologist scheduled for 05/20/2025.  - Discussed the importance of follow-up for comprehensive care.  - Encouraged to attend the upcoming pulmonology appointment.  - Monitoring for any respiratory symptoms or concerns.              Return in about 6 months (around 11/15/2025) for Labs, Recheck BP.    Patient or patient representative verbalized consent for the use of Ambient Listening during the visit with  YESSICA Akers for chart documentation. 5/15/2025  09:37 EDT

## 2025-05-21 ENCOUNTER — TRANSCRIBE ORDERS (OUTPATIENT)
Dept: ADMINISTRATIVE | Facility: HOSPITAL | Age: 55
End: 2025-05-21
Payer: COMMERCIAL

## 2025-05-21 DIAGNOSIS — R91.8 LUNG NODULES: Primary | ICD-10-CM

## 2025-08-27 DIAGNOSIS — I51.7 LVH (LEFT VENTRICULAR HYPERTROPHY): ICD-10-CM

## 2025-08-27 DIAGNOSIS — I10 PRIMARY HYPERTENSION: ICD-10-CM

## 2025-08-27 RX ORDER — METOPROLOL SUCCINATE 25 MG/1
25 TABLET, EXTENDED RELEASE ORAL DAILY
Qty: 30 TABLET | Refills: 2 | Status: SHIPPED | OUTPATIENT
Start: 2025-08-27

## (undated) DEVICE — CANN O2 ETCO2 FITS ALL CONN CO2 SMPL A/ 7IN DISP LF

## (undated) DEVICE — ADAPT CLN BIOGUARD AIR/H2O DISP

## (undated) DEVICE — KT ORCA ORCAPOD DISP STRL

## (undated) DEVICE — TUBING, SUCTION, 1/4" X 10', STRAIGHT: Brand: MEDLINE

## (undated) DEVICE — LN SMPL CO2 SHTRM SD STREAM W/M LUER

## (undated) DEVICE — SENSR O2 OXIMAX FNGR A/ 18IN NONSTR